# Patient Record
Sex: FEMALE | Race: WHITE | NOT HISPANIC OR LATINO | Employment: FULL TIME | URBAN - METROPOLITAN AREA
[De-identification: names, ages, dates, MRNs, and addresses within clinical notes are randomized per-mention and may not be internally consistent; named-entity substitution may affect disease eponyms.]

---

## 2017-01-25 DIAGNOSIS — Z77.21 EXPOSURE TO BLOOD OR BODY FLUID: Primary | ICD-10-CM

## 2017-01-25 DIAGNOSIS — J11.1 INFLUENZA: ICD-10-CM

## 2017-01-25 RX ORDER — OSELTAMIVIR PHOSPHATE 75 MG/1
75 CAPSULE ORAL 2 TIMES DAILY
Qty: 20 CAPSULE | Refills: 0 | Status: SHIPPED | OUTPATIENT
Start: 2017-01-25 | End: 2017-02-04

## 2019-10-23 ENCOUNTER — CLINICAL SUPPORT (OUTPATIENT)
Dept: OTHER | Facility: CLINIC | Age: 29
End: 2019-10-23
Payer: COMMERCIAL

## 2019-10-23 DIAGNOSIS — Z00.8 ENCOUNTER FOR OTHER GENERAL EXAMINATION: ICD-10-CM

## 2019-10-23 PROCEDURE — 82947 ASSAY GLUCOSE BLOOD QUANT: CPT | Mod: QW,S$GLB,, | Performed by: INTERNAL MEDICINE

## 2019-10-23 PROCEDURE — 80061 LIPID PANEL: CPT | Mod: QW,S$GLB,, | Performed by: INTERNAL MEDICINE

## 2019-10-23 PROCEDURE — 99401 PR PREVENT COUNSEL,INDIV,15 MIN: ICD-10-PCS | Mod: S$GLB,,, | Performed by: INTERNAL MEDICINE

## 2019-10-23 PROCEDURE — 80061 PR  LIPID PANEL: ICD-10-PCS | Mod: QW,S$GLB,, | Performed by: INTERNAL MEDICINE

## 2019-10-23 PROCEDURE — 82947 PR  ASSAY QUANTITATIVE,BLOOD GLUCOSE: ICD-10-PCS | Mod: QW,S$GLB,, | Performed by: INTERNAL MEDICINE

## 2019-10-23 PROCEDURE — 99401 PREV MED CNSL INDIV APPRX 15: CPT | Mod: S$GLB,,, | Performed by: INTERNAL MEDICINE

## 2019-10-24 VITALS — HEIGHT: 71 IN

## 2019-10-24 LAB
GLUCOSE SERPL-MCNC: 86 MG/DL (ref 60–140)
HDLC SERPL-MCNC: 90 MG/DL
POC CHOLESTEROL, TOTAL: 158 MG/DL
TRIGL SERPL-MCNC: 45 MG/DL

## 2020-03-11 ENCOUNTER — LAB VISIT (OUTPATIENT)
Dept: LAB | Facility: HOSPITAL | Age: 30
End: 2020-03-11
Attending: NURSE PRACTITIONER
Payer: COMMERCIAL

## 2020-03-11 ENCOUNTER — OFFICE VISIT (OUTPATIENT)
Dept: INTERNAL MEDICINE | Facility: CLINIC | Age: 30
End: 2020-03-11
Payer: COMMERCIAL

## 2020-03-11 VITALS
HEART RATE: 80 BPM | DIASTOLIC BLOOD PRESSURE: 74 MMHG | SYSTOLIC BLOOD PRESSURE: 108 MMHG | WEIGHT: 133.38 LBS | BODY MASS INDEX: 19.09 KG/M2 | OXYGEN SATURATION: 98 % | HEIGHT: 70 IN

## 2020-03-11 DIAGNOSIS — M25.50 ARTHRALGIA, UNSPECIFIED JOINT: ICD-10-CM

## 2020-03-11 DIAGNOSIS — R53.83 FATIGUE, UNSPECIFIED TYPE: ICD-10-CM

## 2020-03-11 DIAGNOSIS — Z11.3 SCREEN FOR STD (SEXUALLY TRANSMITTED DISEASE): ICD-10-CM

## 2020-03-11 DIAGNOSIS — Z00.00 ANNUAL PHYSICAL EXAM: Primary | ICD-10-CM

## 2020-03-11 DIAGNOSIS — R63.4 UNEXPLAINED WEIGHT LOSS: ICD-10-CM

## 2020-03-11 DIAGNOSIS — J30.9 ALLERGIC RHINITIS, UNSPECIFIED SEASONALITY, UNSPECIFIED TRIGGER: ICD-10-CM

## 2020-03-11 DIAGNOSIS — R61 NIGHT SWEATS: ICD-10-CM

## 2020-03-11 DIAGNOSIS — Z00.00 ANNUAL PHYSICAL EXAM: ICD-10-CM

## 2020-03-11 LAB
25(OH)D3+25(OH)D2 SERPL-MCNC: 13 NG/ML (ref 30–96)
BASOPHILS # BLD AUTO: 0.02 K/UL (ref 0–0.2)
BASOPHILS NFR BLD: 0.5 % (ref 0–1.9)
DIFFERENTIAL METHOD: ABNORMAL
EOSINOPHIL # BLD AUTO: 0.1 K/UL (ref 0–0.5)
EOSINOPHIL NFR BLD: 1.9 % (ref 0–8)
ERYTHROCYTE [DISTWIDTH] IN BLOOD BY AUTOMATED COUNT: 11.7 % (ref 11.5–14.5)
HCT VFR BLD AUTO: 39.8 % (ref 37–48.5)
HGB BLD-MCNC: 12.7 G/DL (ref 12–16)
IMM GRANULOCYTES # BLD AUTO: 0.01 K/UL (ref 0–0.04)
IMM GRANULOCYTES NFR BLD AUTO: 0.2 % (ref 0–0.5)
LYMPHOCYTES # BLD AUTO: 1.4 K/UL (ref 1–4.8)
LYMPHOCYTES NFR BLD: 33.1 % (ref 18–48)
MCH RBC QN AUTO: 35.3 PG (ref 27–31)
MCHC RBC AUTO-ENTMCNC: 31.9 G/DL (ref 32–36)
MCV RBC AUTO: 111 FL (ref 82–98)
MONOCYTES # BLD AUTO: 0.4 K/UL (ref 0.3–1)
MONOCYTES NFR BLD: 10.1 % (ref 4–15)
NEUTROPHILS # BLD AUTO: 2.2 K/UL (ref 1.8–7.7)
NEUTROPHILS NFR BLD: 54.2 % (ref 38–73)
NRBC BLD-RTO: 0 /100 WBC
PLATELET # BLD AUTO: 238 K/UL (ref 150–350)
PMV BLD AUTO: 10 FL (ref 9.2–12.9)
RBC # BLD AUTO: 3.6 M/UL (ref 4–5.4)
WBC # BLD AUTO: 4.14 K/UL (ref 3.9–12.7)

## 2020-03-11 PROCEDURE — 87491 CHLMYD TRACH DNA AMP PROBE: CPT

## 2020-03-11 PROCEDURE — 99999 PR PBB SHADOW E&M-EST. PATIENT-LVL III: ICD-10-PCS | Mod: PBBFAC,,, | Performed by: NURSE PRACTITIONER

## 2020-03-11 PROCEDURE — 99385 PREV VISIT NEW AGE 18-39: CPT | Mod: S$GLB,,, | Performed by: NURSE PRACTITIONER

## 2020-03-11 PROCEDURE — 83540 ASSAY OF IRON: CPT

## 2020-03-11 PROCEDURE — 80053 COMPREHEN METABOLIC PANEL: CPT

## 2020-03-11 PROCEDURE — 85025 COMPLETE CBC W/AUTO DIFF WBC: CPT

## 2020-03-11 PROCEDURE — 99385 PR PREVENTIVE VISIT,NEW,18-39: ICD-10-PCS | Mod: S$GLB,,, | Performed by: NURSE PRACTITIONER

## 2020-03-11 PROCEDURE — 86703 HIV-1/HIV-2 1 RESULT ANTBDY: CPT

## 2020-03-11 PROCEDURE — 81003 URINALYSIS AUTO W/O SCOPE: CPT

## 2020-03-11 PROCEDURE — 86038 ANTINUCLEAR ANTIBODIES: CPT

## 2020-03-11 PROCEDURE — 86039 ANTINUCLEAR ANTIBODIES (ANA): CPT

## 2020-03-11 PROCEDURE — 84443 ASSAY THYROID STIM HORMONE: CPT

## 2020-03-11 PROCEDURE — 82607 VITAMIN B-12: CPT

## 2020-03-11 PROCEDURE — 86235 NUCLEAR ANTIGEN ANTIBODY: CPT | Mod: 59

## 2020-03-11 PROCEDURE — 82306 VITAMIN D 25 HYDROXY: CPT

## 2020-03-11 PROCEDURE — 99999 PR PBB SHADOW E&M-EST. PATIENT-LVL III: CPT | Mod: PBBFAC,,, | Performed by: NURSE PRACTITIONER

## 2020-03-11 RX ORDER — FLUTICASONE PROPIONATE 50 MCG
1 SPRAY, SUSPENSION (ML) NASAL DAILY
Qty: 16 G | Refills: 0 | Status: SHIPPED | OUTPATIENT
Start: 2020-03-11 | End: 2020-04-02

## 2020-03-11 RX ORDER — AZELASTINE 1 MG/ML
1 SPRAY, METERED NASAL 2 TIMES DAILY
Qty: 30 ML | Refills: 0 | Status: SHIPPED | OUTPATIENT
Start: 2020-03-11 | End: 2021-04-26

## 2020-03-11 NOTE — PROGRESS NOTES
Internal Medicine Annual Exam       CHIEF COMPLAINT     The patient, Rachel Duncan, who is a 30 y.o. female presents for an annual exam.    HPI     Reports night sweats intermittent x 6 months. 1 episode in the past 2 weeks. 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     Exercise- none, walks dog 2-3 times per day   Sleep- trouble sleeping, uses melatonin. Still wakes up nightly to urinate.   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 Moderate amount now that she is working.     HM-   PAP- 2 years ago   Flu- refuses   Tdap- less than 10 years.   STD screening - 2 years ago - condom use 100%     Past Medical History:  Past Medical History:   Diagnosis Date    Mononucleosis        No past surgical history on file.     No family history on file.     Social History     Socioeconomic History    Marital status: Single     Spouse name: Not on file    Number of children: Not on file    Years of education: Not on file    Highest education level: Not on file   Occupational History    Not on file   Social Needs    Financial resource strain: Not on file    Food insecurity:     Worry: Not on file     Inability: Not on file    Transportation needs:     Medical: Not on file     Non-medical: Not on file   Tobacco Use    Smoking status: Never Smoker    Smokeless tobacco: Never Used   Substance and Sexual Activity    Alcohol use: Yes     Frequency: Monthly or less     Drinks per session: 1 or 2     Binge frequency: Monthly    Drug use: Not on file    Sexual activity: Not on file   Lifestyle    Physical activity:     Days per week: Not on file     Minutes per session: Not on file    Stress: Not on file   Relationships    Social connections:     Talks on phone: Not on file     Gets together: Not on file     Attends Judaism service: Not on file     Active member of club or organization: Not on file     Attends meetings of clubs or organizations: Not on file      "Relationship status: Not on file   Other Topics Concern    Not on file   Social History Narrative    Not on file        Social History     Tobacco Use   Smoking Status Never Smoker   Smokeless Tobacco Never Used        Allergies as of 03/11/2020    (No Known Allergies)          Home Medications:  Prior to Admission medications    Not on File       Review of Systems:  Review of Systems   Constitutional: Positive for chills, diaphoresis, fatigue and unexpected weight change. Negative for activity change, appetite change and fever.   HENT: Positive for postnasal drip, rhinorrhea and sneezing. Negative for congestion.    Eyes: Positive for discharge. Negative for visual disturbance.        Last eye exam - 1 year ago.    Respiratory: Negative for cough, shortness of breath and wheezing.    Cardiovascular: Negative for chest pain, palpitations and leg swelling.   Gastrointestinal: Negative for abdominal pain, constipation, diarrhea, nausea and vomiting.   Genitourinary: Negative.    Musculoskeletal: Positive for arthralgias (bilateral elbow, hips, back and neck - for "years". Uses cyclobenzaprine as needed. symptoms are intermitent worse with stress. ). Negative for myalgias.   Skin: Negative for rash.   Neurological: Positive for headaches (1-2 times per week. located to the paretial region descrobed as dull. occurs gradually throughout the day ). Negative for dizziness and light-headedness.   Psychiatric/Behavioral: Positive for sleep disturbance. Negative for dysphoric mood. The patient is not nervous/anxious.        Health Maintainence:   Immunizations:  Health Maintenance       Date Due Completion Date    HIV Screening 02/13/2005 ---    TETANUS VACCINE 02/13/2008 ---    Pap Smear with HPV Cotest 02/13/2011 ---    Influenza Vaccine (1) 09/01/2019 ---           PHYSICAL EXAM     /74 (BP Location: Right arm, Patient Position: Sitting, BP Method: Medium (Manual))   Pulse 80   Ht 5' 10" (1.778 m)   Wt 60.5 kg " (133 lb 6.1 oz)   SpO2 98%   BMI 19.14 kg/m²  Body mass index is 19.14 kg/m².    Physical Exam   Constitutional: She is oriented to person, place, and time. She appears well-developed and well-nourished.   HENT:   Head: Normocephalic.   Right Ear: External ear normal.   Left Ear: External ear normal.   Nose: Nose normal.   Mouth/Throat: Oropharynx is clear and moist. No oropharyngeal exudate.   Eyes: Pupils are equal, round, and reactive to light.   Neck: Neck supple. No JVD present. No tracheal deviation present. No thyromegaly present.   Cardiovascular: Normal rate, regular rhythm, normal heart sounds and intact distal pulses. Exam reveals no gallop and no friction rub.   No murmur heard.  Pulmonary/Chest: Effort normal and breath sounds normal. No respiratory distress. She has no wheezes. She has no rales.   Abdominal: Soft. Bowel sounds are normal. She exhibits no distension. There is no tenderness.   Musculoskeletal: Normal range of motion. She exhibits no edema or tenderness.   Lymphadenopathy:     She has no cervical adenopathy.   Neurological: She is alert and oriented to person, place, and time. She displays normal reflexes. No cranial nerve deficit (III-XII grossly intact ). She exhibits normal muscle tone.   Skin: Skin is warm and dry. Capillary refill takes less than 2 seconds. No rash noted.   Psychiatric: She has a normal mood and affect. Her behavior is normal.   Vitals reviewed.      LABS     No results found for: LABA1C, HGBA1C  CMP  Sodium   Date Value Ref Range Status   08/24/2007 137 136 - 145 mMol/l Final     Potassium   Date Value Ref Range Status   08/24/2007 3.7 3.3 - 5.3 mMol/l Final     Chloride   Date Value Ref Range Status   08/24/2007 101 95 - 110 mMol/l Final     CO2   Date Value Ref Range Status   08/24/2007 27 23.0 - 29.0 mEq/L Final     Glucose   Date Value Ref Range Status   08/24/2007 112 (H) 70 - 110 mg/dl Final     BUN, Bld   Date Value Ref Range Status   08/24/2007 6 5 - 23  mg/dl Final     Creatinine   Date Value Ref Range Status   08/24/2007 1.0 0.5 - 1.4 mg/dl Final     Calcium   Date Value Ref Range Status   08/24/2007 8.7 8.7 - 10.5 mg/dl Final     Total Protein   Date Value Ref Range Status   08/24/2007 7.3 6.0 - 8.4 gm/dl Final     Albumin   Date Value Ref Range Status   08/24/2007 4.5 3.2 - 4.7 g/dl Final     Total Bilirubin   Date Value Ref Range Status   08/24/2007 0.5 0.1 - 1.0 mg/dl Final     Comment:     These are the guidelines recommended by the .  Especially  for infants and newborns, interpretation of results should be based  on gestational age, weight and in agreement with clinical  observations.  .  Premature Infant recommended reference range (Bilirubin, Total)  Up to 24 hours.............<8.0 mg/dl  Up to 48 hours............<12.0 mg/dl  3-5 days..................<15.0 mg/dl  6-29 days.................<15.0 mg/dl       Alkaline Phosphatase   Date Value Ref Range Status   08/24/2007 86 47 - 119 U/L Final     AST   Date Value Ref Range Status   08/24/2007 23 0 - 31 U/L Final     ALT   Date Value Ref Range Status   08/24/2007 20 0 - 31 U/L Final     Lab Results   Component Value Date    WBC 7.59 08/24/2007    HGB 11.7 (L) 08/24/2007    HCT 34.2 (L) 08/24/2007    MCV 92.7 08/24/2007     08/24/2007     No results found for: CHOL  No results found for: HDL  No results found for: LDLCALC  No results found for: TRIG  No results found for: CHOLHDL  No results found for: TSH, D7MEEEG, Z1NDTYR, THYROIDAB    ASSESSMENT/PLAN     Rachel Duncan is a 30 y.o. female    Annual physical exam- al l ge and gender related screenings.   -     CBC auto differential; Future; Expected date: 03/11/2020  -     Comprehensive metabolic panel; Future; Expected date: 03/11/2020  -     TSH; Future; Expected date: 03/11/2020  -     Vitamin D; Future; Expected date: 03/11/2020  -     Urinalysis    Fatigue, unspecified type- will check labs. Suggest exercise and healthy diet.    -     Iron and TIBC; Future; Expected date: 03/11/2020  -     TO Screen w/Reflex; Future; Expected date: 03/11/2020    Arthralgia, unspecified joint- tumeric as needed.   -     TO Screen w/Reflex; Future; Expected date: 03/11/2020    Night sweats- with unexplained weight loss and h/o work with incarcerated clients. Will send for TB gold.   -     Quantiferon Gold TB; Future; Expected date: 03/11/2020    Unexplained weight loss  -     Quantiferon Gold TB; Future; Expected date: 03/11/2020    Allergic rhinitis, unspecified seasonality, unspecified trigger- poorly controlled. Will start flonase and astelin.   -     azelastine (ASTELIN) 137 mcg (0.1 %) nasal spray; 1 spray (137 mcg total) by Nasal route 2 (two) times daily.  Dispense: 30 mL; Refill: 0  -     fluticasone propionate (FLONASE) 50 mcg/actuation nasal spray; 1 spray (50 mcg total) by Each Nostril route once daily.  Dispense: 16 g; Refill: 0    Screen for STD (sexually transmitted disease)  -     HIV 1/2 Ag/Ab (4th Gen); Future; Expected date: 03/11/2020  -     C. trachomatis/N. gonorrhoeae by AMP DNA    Other orders  -     sodium chloride (SALINE NASAL) 0.65 % nasal spray; 1 spray by Nasal route as needed for Congestion.; Refill: 12    Follow up with PCP     Ernestina BELLO, APRN, FNP-c   Department of Internal Medicine - [facility]  2:00 PM

## 2020-03-12 ENCOUNTER — TELEPHONE (OUTPATIENT)
Dept: INTERNAL MEDICINE | Facility: CLINIC | Age: 30
End: 2020-03-12

## 2020-03-12 DIAGNOSIS — D75.89 MACROCYTOSIS: Primary | ICD-10-CM

## 2020-03-12 LAB
ALBUMIN SERPL BCP-MCNC: 5.1 G/DL (ref 3.5–5.2)
ALP SERPL-CCNC: 58 U/L (ref 55–135)
ALT SERPL W/O P-5'-P-CCNC: 12 U/L (ref 10–44)
ANION GAP SERPL CALC-SCNC: 15 MMOL/L (ref 8–16)
AST SERPL-CCNC: 23 U/L (ref 10–40)
BILIRUB SERPL-MCNC: 0.8 MG/DL (ref 0.1–1)
BILIRUB UR QL STRIP: NEGATIVE
BUN SERPL-MCNC: 9 MG/DL (ref 6–20)
C TRACH DNA SPEC QL NAA+PROBE: NOT DETECTED
CALCIUM SERPL-MCNC: 9.5 MG/DL (ref 8.7–10.5)
CHLORIDE SERPL-SCNC: 100 MMOL/L (ref 95–110)
CLARITY UR REFRACT.AUTO: CLEAR
CO2 SERPL-SCNC: 24 MMOL/L (ref 23–29)
COLOR UR AUTO: YELLOW
CREAT SERPL-MCNC: 0.8 MG/DL (ref 0.5–1.4)
EST. GFR  (AFRICAN AMERICAN): >60 ML/MIN/1.73 M^2
EST. GFR  (NON AFRICAN AMERICAN): >60 ML/MIN/1.73 M^2
GLUCOSE SERPL-MCNC: 90 MG/DL (ref 70–110)
GLUCOSE UR QL STRIP: NEGATIVE
HGB UR QL STRIP: NEGATIVE
HIV 1+2 AB+HIV1 P24 AG SERPL QL IA: NEGATIVE
IRON SERPL-MCNC: 111 UG/DL (ref 30–160)
KETONES UR QL STRIP: NEGATIVE
LEUKOCYTE ESTERASE UR QL STRIP: NEGATIVE
N GONORRHOEA DNA SPEC QL NAA+PROBE: NOT DETECTED
NITRITE UR QL STRIP: NEGATIVE
PH UR STRIP: 5 [PH] (ref 5–8)
POTASSIUM SERPL-SCNC: 3.7 MMOL/L (ref 3.5–5.1)
PROT SERPL-MCNC: 8.1 G/DL (ref 6–8.4)
PROT UR QL STRIP: NEGATIVE
SATURATED IRON: 28 % (ref 20–50)
SODIUM SERPL-SCNC: 139 MMOL/L (ref 136–145)
SP GR UR STRIP: 1.01 (ref 1–1.03)
TOTAL IRON BINDING CAPACITY: 398 UG/DL (ref 250–450)
TRANSFERRIN SERPL-MCNC: 269 MG/DL (ref 200–375)
TSH SERPL DL<=0.005 MIU/L-ACNC: 1.43 UIU/ML (ref 0.4–4)
URN SPEC COLLECT METH UR: NORMAL
VIT B12 SERPL-MCNC: 264 PG/ML (ref 210–950)

## 2020-03-12 NOTE — TELEPHONE ENCOUNTER
----- Message from Ernestina Pop NP sent at 3/12/2020  7:52 AM CDT -----  Please see if the lab can add Vit B12 panel to the labs.

## 2020-03-17 ENCOUNTER — PATIENT MESSAGE (OUTPATIENT)
Dept: INTERNAL MEDICINE | Facility: CLINIC | Age: 30
End: 2020-03-17

## 2020-03-17 DIAGNOSIS — E55.9 VITAMIN D DEFICIENCY: Primary | ICD-10-CM

## 2020-03-17 RX ORDER — ERGOCALCIFEROL 1.25 MG/1
50000 CAPSULE ORAL
Qty: 12 CAPSULE | Refills: 0 | Status: SHIPPED | OUTPATIENT
Start: 2020-03-17 | End: 2020-04-09

## 2020-03-18 LAB
ANA SER QL IF: POSITIVE
ANA TITR SER IF: NORMAL {TITER}

## 2020-03-19 ENCOUNTER — TELEPHONE (OUTPATIENT)
Dept: INTERNAL MEDICINE | Facility: CLINIC | Age: 30
End: 2020-03-19

## 2020-03-19 DIAGNOSIS — R76.8 POSITIVE ANA (ANTINUCLEAR ANTIBODY): Primary | ICD-10-CM

## 2020-03-19 NOTE — TELEPHONE ENCOUNTER
----- Message from Ed Aly sent at 3/19/2020  1:09 PM CDT -----  Contact: Pt [phone]  Patient is returning a phone call.  Who left a message for the patient: KRISTIN Do  Does patient know what this is regarding:  No

## 2020-03-19 NOTE — TELEPHONE ENCOUNTER
LM for pt to return call regarding labs   TO - positive - will refer to rheum   Vit d - low - will start ergo 50,000 weekly and vitmain d3 2000iu daily

## 2020-03-22 LAB
ANTI SM ANTIBODY: 0.07 RATIO (ref 0–0.99)
ANTI SM/RNP ANTIBODY: 0.09 RATIO (ref 0–0.99)
ANTI-SM INTERPRETATION: NEGATIVE
ANTI-SM/RNP INTERPRETATION: NEGATIVE
ANTI-SSA ANTIBODY: 0.07 RATIO (ref 0–0.99)
ANTI-SSA INTERPRETATION: NEGATIVE
ANTI-SSB ANTIBODY: 0.08 RATIO (ref 0–0.99)
ANTI-SSB INTERPRETATION: NEGATIVE
DSDNA AB SER-ACNC: NORMAL [IU]/ML

## 2020-04-02 DIAGNOSIS — J30.9 ALLERGIC RHINITIS, UNSPECIFIED SEASONALITY, UNSPECIFIED TRIGGER: ICD-10-CM

## 2020-04-02 RX ORDER — FLUTICASONE PROPIONATE 50 MCG
1 SPRAY, SUSPENSION (ML) NASAL DAILY
Qty: 16 ML | Refills: 0 | Status: SHIPPED | OUTPATIENT
Start: 2020-04-02 | End: 2020-05-11

## 2020-04-09 DIAGNOSIS — E55.9 VITAMIN D DEFICIENCY: ICD-10-CM

## 2020-04-09 RX ORDER — ERGOCALCIFEROL 1.25 MG/1
CAPSULE ORAL
Qty: 4 CAPSULE | Refills: 2 | Status: SHIPPED | OUTPATIENT
Start: 2020-04-09 | End: 2020-07-02

## 2020-05-09 DIAGNOSIS — J30.9 ALLERGIC RHINITIS, UNSPECIFIED SEASONALITY, UNSPECIFIED TRIGGER: ICD-10-CM

## 2020-05-11 RX ORDER — FLUTICASONE PROPIONATE 50 MCG
1 SPRAY, SUSPENSION (ML) NASAL DAILY
Qty: 16 ML | Refills: 0 | Status: SHIPPED | OUTPATIENT
Start: 2020-05-11 | End: 2021-04-26

## 2020-05-22 ENCOUNTER — PATIENT MESSAGE (OUTPATIENT)
Dept: RHEUMATOLOGY | Facility: CLINIC | Age: 30
End: 2020-05-22

## 2020-07-22 ENCOUNTER — OFFICE VISIT (OUTPATIENT)
Dept: RHEUMATOLOGY | Facility: CLINIC | Age: 30
End: 2020-07-22
Payer: COMMERCIAL

## 2020-07-22 ENCOUNTER — HOSPITAL ENCOUNTER (OUTPATIENT)
Dept: RADIOLOGY | Facility: HOSPITAL | Age: 30
Discharge: HOME | End: 2020-07-22
Payer: COMMERCIAL

## 2020-07-22 VITALS
HEART RATE: 92 BPM | HEIGHT: 70 IN | SYSTOLIC BLOOD PRESSURE: 91 MMHG | TEMPERATURE: 97 F | DIASTOLIC BLOOD PRESSURE: 66 MMHG | BODY MASS INDEX: 20.01 KG/M2 | WEIGHT: 139.75 LBS

## 2020-07-22 DIAGNOSIS — R76.8 POSITIVE ANA (ANTINUCLEAR ANTIBODY): Primary | ICD-10-CM

## 2020-07-22 DIAGNOSIS — M54.9 UPPER BACK PAIN: ICD-10-CM

## 2020-07-22 PROCEDURE — 72070 X-RAY EXAM THORAC SPINE 2VWS: CPT | Mod: 26,,, | Performed by: RADIOLOGY

## 2020-07-22 PROCEDURE — 99244 OFF/OP CNSLTJ NEW/EST MOD 40: CPT | Mod: S$GLB,,,

## 2020-07-22 PROCEDURE — 99999 PR PBB SHADOW E&M-EST. PATIENT-LVL III: CPT | Mod: PBBFAC,,,

## 2020-07-22 PROCEDURE — 72070 X-RAY EXAM THORAC SPINE 2VWS: CPT | Mod: TC

## 2020-07-22 PROCEDURE — 72070 XR THORACIC SPINE AP LATERAL: ICD-10-PCS | Mod: 26,,, | Performed by: RADIOLOGY

## 2020-07-22 PROCEDURE — 99999 PR PBB SHADOW E&M-EST. PATIENT-LVL III: ICD-10-PCS | Mod: PBBFAC,,,

## 2020-07-22 PROCEDURE — 99244 PR OFFICE CONSULTATION,LEVEL IV: ICD-10-PCS | Mod: S$GLB,,,

## 2020-07-22 ASSESSMENT — ROUTINE ASSESSMENT OF PATIENT INDEX DATA (RAPID3)
PSYCHOLOGICAL DISTRESS SCORE: 2.2
PAIN SCORE: 1
AM STIFFNESS SCORE: 1, YES
TOTAL RAPID3 SCORE: 0.33
MDHAQ FUNCTION SCORE: 0
WHEN YOU AWAKENED IN THE MORNING OVER THE LAST WEEK, PLEASE INDICATE THE AMOUNT OF TIME IT TAKES UNTIL YOU ARE AS LIMBER AS YOU WILL BE FOR THE DAY: 2 HOURS
PATIENT GLOBAL ASSESSMENT SCORE: 0
FATIGUE SCORE: 1

## 2020-07-22 NOTE — ASSESSMENT & PLAN NOTE
Pt with some neck and upper back pain; Subjectively feels her spine is not in alignment but appears aligned on exam with no limited range of motion  - Has taken flexeril with some relief and tylenol which is mildly effective  - Recommended trying advil or aleve to see if this helps  - Will get Xray of thoracic spine

## 2020-07-22 NOTE — PROGRESS NOTES
Subjective:       Patient ID: Rachel Duncan is a 30 y.o. female.    Chief Complaint: Follow-up for abnormal lab (TO)    HPI   Pt is a 31yo F who was referred here after having a positive TO test in March.  At that time she was worked up for having night sweats and fatigue.  Reported a recent history of weight loss as well which she had attributed to stress from school.  At that time she was diagnosed with a Vitamin D deficiency.  Besides the weakly positive TO at 1:80 the rest of the workup was normal.  She was started on Vitamin D supplementation and since then now feels much improved.  She is no longer having frequent night sweats but has had them rarely.  When she has these she sometimes has chills, but never feels as if she has a fever.  She states that she has had achy pains for a long time since her growth spurt when she was a child.  Mainly states the pain is in her neck and upper back.  Feels she has morning stiffness with this sometimes and can be better with activity but not always.  Often feels there is a crick in her neck and is wondering if her spine is in alignment.  Has considered going to a chiropractor.  She sometimes takes tylenol for the pain which helps minimally.  Has taken Flexeril prn with good results but tries to limit due to sedation.  Otherwise today patient is feeling well.  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      Review of Systems    No skin rashes,malar rash,photosensitivity   No telangiectasias   No psoriasis   No patchy alopecia   No oral and nasal ulcers   No dry eyes and dry mouth   No pleurisy or any cardiopulmonary complaints   No dysphagia,diplopia and dysphonia and muscle weakness   No n/v/d/c   No acid reflux  No raynaud's  No digital ulcers   No cytopenias   No renal issues   No blood clots   +Night sweats, chills; No fever,weight loss and loss of appetite  No pregnancy losses/pre term deliveries/pregnancy  "complications   +Occ. Mild frontal headaches  No recurrent conjunctivitis or uveitis or scleritis or episcleritis   No chronic or bloody diarrhea with no u colitis or crohn's/inflammatory bowel disease   No vaginal or urethral  d/c/STDs/no ulcers     Objective:   BP 91/66   Pulse 92   Temp 97.1 °F (36.2 °C)   Ht 5' 10" (1.778 m)   Wt 63.4 kg (139 lb 12.4 oz)   LMP 07/08/2020   BMI 20.06 kg/m²      Physical Exam   Nursing note and vitals reviewed.  Constitutional: She is oriented to person, place, and time and well-developed, well-nourished, and in no distress. No distress.   HENT:   Head: Normocephalic and atraumatic.   Eyes: EOM are normal. Pupils are equal, round, and reactive to light. Right eye exhibits no discharge. Left eye exhibits no discharge. No scleral icterus.   Neck: Normal range of motion and full passive range of motion without pain. Neck supple. No spinous process tenderness and no muscular tenderness present.   Cardiovascular: Normal rate, regular rhythm, normal heart sounds and intact distal pulses.    Pulmonary/Chest: Effort normal and breath sounds normal. No respiratory distress. She has no wheezes.   Abdominal: Soft. She exhibits no distension. There is no abdominal tenderness. There is no rebound and no guarding.   Back/Neck Exam   General Inspection She has no scoliosis   Spinal Kyphosis: absent  Spinal Lordosis:  absent    Neck Range of Motion   Right Lateral Bend: normal  Left Lateral Bend: normal  Right Rotation: normal  Left Rotation: normal  Neurological: She is alert and oriented to person, place, and time.   Skin: Skin is warm and dry. No rash noted. She is not diaphoretic. No erythema.     Psychiatric: Mood and affect normal.   Musculoskeletal: No tenderness, deformity or edema.      Comments: Some mild hyperextensibility of joints. Full ROM otherwise. No joint swelling or synovitis.       3/11/2020    TO 1:80 Homogenous  TO Profile Negative  Vit D 13  TSH WNL  Quantiferon " Negative       Assessment:       1. Positive TO (antinuclear antibody)    2. Upper back pain             Plan:       Problem List Items Addressed This Visit        Immunology/Multi System    Positive TO (antinuclear antibody) - Primary     Pt with TO positive at 1:80; TO profile was negative  - No concerning history or exam findings for lupus or other rheumatological illness            Orthopedic    Upper back pain     Pt with some neck and upper back pain; Subjectively feels her spine is not in alignment but appears aligned on exam with no limited range of motion  - Has taken flexeril with some relief and tylenol which is mildly effective  - Recommended trying advil or aleve to see if this helps  - Will get Xray of thoracic spine         Relevant Orders    X-Ray Thoracic Spine AP Lateral (Completed)              Prakash Lowe MD, PGY-4  [facility]

## 2020-07-22 NOTE — ASSESSMENT & PLAN NOTE
Pt with TO positive at 1:80; TO profile was negative  - No concerning history or exam findings for lupus or other rheumatological illness

## 2020-07-23 NOTE — PROGRESS NOTES
I have personally taken the history and examined the patient and concur with the resident's note as above.  She was referred in because of a positive TO which was obtained because of fatigue.  The fatigue responded to vitamin-D supplementation.  Her TO is barely positive and I suspect it is a false-positive test.  She also does have some upper back pain.  She has some suggestion of inflammatory back pain.  We will obtain an x-ray of her thoracic spine.  No other workup is needed at this time.  I did recommend trying NSAIDs rather than Tylenol when her back is bothering her.

## 2021-02-23 ENCOUNTER — PATIENT MESSAGE (OUTPATIENT)
Dept: RHEUMATOLOGY | Facility: CLINIC | Age: 31
End: 2021-02-23

## 2021-03-24 ENCOUNTER — TELEPHONE (OUTPATIENT)
Dept: OBSTETRICS AND GYNECOLOGY | Facility: CLINIC | Age: 31
End: 2021-03-24

## 2021-04-15 ENCOUNTER — HOSPITAL ENCOUNTER (OUTPATIENT)
Dept: RADIOLOGY | Facility: HOSPITAL | Age: 31
Discharge: HOME | End: 2021-04-15
Attending: NURSE PRACTITIONER
Payer: COMMERCIAL

## 2021-04-15 ENCOUNTER — OFFICE VISIT (OUTPATIENT)
Dept: INTERNAL MEDICINE | Facility: CLINIC | Age: 31
End: 2021-04-15
Payer: COMMERCIAL

## 2021-04-15 ENCOUNTER — TELEPHONE (OUTPATIENT)
Dept: INTERNAL MEDICINE | Facility: CLINIC | Age: 31
End: 2021-04-15

## 2021-04-15 ENCOUNTER — LAB VISIT (OUTPATIENT)
Dept: LAB | Facility: HOSPITAL | Age: 31
End: 2021-04-15
Payer: COMMERCIAL

## 2021-04-15 ENCOUNTER — IMMUNIZATION (OUTPATIENT)
Dept: PHARMACY | Facility: CLINIC | Age: 31
End: 2021-04-15
Payer: COMMERCIAL

## 2021-04-15 VITALS
DIASTOLIC BLOOD PRESSURE: 75 MMHG | WEIGHT: 136.69 LBS | SYSTOLIC BLOOD PRESSURE: 110 MMHG | HEART RATE: 79 BPM | BODY MASS INDEX: 19.57 KG/M2 | HEIGHT: 70 IN | OXYGEN SATURATION: 99 %

## 2021-04-15 DIAGNOSIS — Z11.59 ENCOUNTER FOR HEPATITIS C SCREENING TEST FOR LOW RISK PATIENT: ICD-10-CM

## 2021-04-15 DIAGNOSIS — Z01.89 ROUTINE LAB DRAW: ICD-10-CM

## 2021-04-15 DIAGNOSIS — M62.81 MUSCLE WEAKNESS (GENERALIZED): ICD-10-CM

## 2021-04-15 DIAGNOSIS — E55.9 VITAMIN D DEFICIENCY: ICD-10-CM

## 2021-04-15 DIAGNOSIS — Z00.00 ANNUAL PHYSICAL EXAM: Primary | ICD-10-CM

## 2021-04-15 DIAGNOSIS — R47.89 EPISODE OF CHANGE IN SPEECH: ICD-10-CM

## 2021-04-15 DIAGNOSIS — R51.9 NONINTRACTABLE HEADACHE, UNSPECIFIED CHRONICITY PATTERN, UNSPECIFIED HEADACHE TYPE: ICD-10-CM

## 2021-04-15 DIAGNOSIS — Z00.00 ANNUAL PHYSICAL EXAM: ICD-10-CM

## 2021-04-15 DIAGNOSIS — R42 DIZZINESS AND GIDDINESS: ICD-10-CM

## 2021-04-15 DIAGNOSIS — G62.9 NEUROPATHY: ICD-10-CM

## 2021-04-15 DIAGNOSIS — Z12.4 PAPANICOLAOU SMEAR FOR CERVICAL CANCER SCREENING: ICD-10-CM

## 2021-04-15 DIAGNOSIS — Z23 NEED FOR TDAP VACCINATION: ICD-10-CM

## 2021-04-15 DIAGNOSIS — M54.9 DORSALGIA, UNSPECIFIED: ICD-10-CM

## 2021-04-15 LAB
25(OH)D3+25(OH)D2 SERPL-MCNC: 23 NG/ML (ref 30–96)
ALBUMIN SERPL BCP-MCNC: 4.7 G/DL (ref 3.5–5.2)
ALP SERPL-CCNC: 48 U/L (ref 55–135)
ALT SERPL W/O P-5'-P-CCNC: 7 U/L (ref 10–44)
ANION GAP SERPL CALC-SCNC: 9 MMOL/L (ref 8–16)
AST SERPL-CCNC: 16 U/L (ref 10–40)
BASOPHILS # BLD AUTO: 0.02 K/UL (ref 0–0.2)
BASOPHILS NFR BLD: 0.5 % (ref 0–1.9)
BILIRUB SERPL-MCNC: 0.6 MG/DL (ref 0.1–1)
BUN SERPL-MCNC: 10 MG/DL (ref 6–20)
CALCIUM SERPL-MCNC: 9.9 MG/DL (ref 8.7–10.5)
CHLORIDE SERPL-SCNC: 104 MMOL/L (ref 95–110)
CO2 SERPL-SCNC: 29 MMOL/L (ref 23–29)
CREAT SERPL-MCNC: 0.8 MG/DL (ref 0.5–1.4)
CRP SERPL-MCNC: 0.7 MG/L (ref 0–8.2)
DIFFERENTIAL METHOD: ABNORMAL
EOSINOPHIL # BLD AUTO: 0 K/UL (ref 0–0.5)
EOSINOPHIL NFR BLD: 1 % (ref 0–8)
ERYTHROCYTE [DISTWIDTH] IN BLOOD BY AUTOMATED COUNT: 11.5 % (ref 11.5–14.5)
ERYTHROCYTE [SEDIMENTATION RATE] IN BLOOD BY WESTERGREN METHOD: <2 MM/HR (ref 0–36)
EST. GFR  (AFRICAN AMERICAN): >60 ML/MIN/1.73 M^2
EST. GFR  (NON AFRICAN AMERICAN): >60 ML/MIN/1.73 M^2
GLUCOSE SERPL-MCNC: 101 MG/DL (ref 70–110)
HCT VFR BLD AUTO: 37.6 % (ref 37–48.5)
HGB BLD-MCNC: 12.8 G/DL (ref 12–16)
IMM GRANULOCYTES # BLD AUTO: 0.01 K/UL (ref 0–0.04)
IMM GRANULOCYTES NFR BLD AUTO: 0.3 % (ref 0–0.5)
LYMPHOCYTES # BLD AUTO: 1.3 K/UL (ref 1–4.8)
LYMPHOCYTES NFR BLD: 33.2 % (ref 18–48)
MCH RBC QN AUTO: 35.2 PG (ref 27–31)
MCHC RBC AUTO-ENTMCNC: 34 G/DL (ref 32–36)
MCV RBC AUTO: 103 FL (ref 82–98)
MONOCYTES # BLD AUTO: 0.3 K/UL (ref 0.3–1)
MONOCYTES NFR BLD: 8.7 % (ref 4–15)
NEUTROPHILS # BLD AUTO: 2.2 K/UL (ref 1.8–7.7)
NEUTROPHILS NFR BLD: 56.3 % (ref 38–73)
NRBC BLD-RTO: 0 /100 WBC
PLATELET # BLD AUTO: 255 K/UL (ref 150–450)
PMV BLD AUTO: 10 FL (ref 9.2–12.9)
POTASSIUM SERPL-SCNC: 4.4 MMOL/L (ref 3.5–5.1)
PROT SERPL-MCNC: 7.8 G/DL (ref 6–8.4)
RBC # BLD AUTO: 3.64 M/UL (ref 4–5.4)
SODIUM SERPL-SCNC: 142 MMOL/L (ref 136–145)
TSH SERPL DL<=0.005 MIU/L-ACNC: 1.53 UIU/ML (ref 0.4–4)
WBC # BLD AUTO: 3.89 K/UL (ref 3.9–12.7)

## 2021-04-15 PROCEDURE — 72050 XR CERVICAL SPINE COMPLETE 5 VIEW: ICD-10-PCS | Mod: 26,,, | Performed by: RADIOLOGY

## 2021-04-15 PROCEDURE — 72110 XR LUMBAR SPINE COMPLETE 5 VIEW: ICD-10-PCS | Mod: 26,,, | Performed by: RADIOLOGY

## 2021-04-15 PROCEDURE — 99214 PR OFFICE/OUTPT VISIT, EST, LEVL IV, 30-39 MIN: ICD-10-PCS | Mod: S$GLB,,, | Performed by: NURSE PRACTITIONER

## 2021-04-15 PROCEDURE — 86803 HEPATITIS C AB TEST: CPT | Performed by: NURSE PRACTITIONER

## 2021-04-15 PROCEDURE — 72110 X-RAY EXAM L-2 SPINE 4/>VWS: CPT | Mod: TC

## 2021-04-15 PROCEDURE — 86140 C-REACTIVE PROTEIN: CPT | Performed by: NURSE PRACTITIONER

## 2021-04-15 PROCEDURE — 99999 PR PBB SHADOW E&M-EST. PATIENT-LVL V: CPT | Mod: PBBFAC,,, | Performed by: NURSE PRACTITIONER

## 2021-04-15 PROCEDURE — 3008F PR BODY MASS INDEX (BMI) DOCUMENTED: ICD-10-PCS | Mod: CPTII,S$GLB,, | Performed by: NURSE PRACTITIONER

## 2021-04-15 PROCEDURE — 82607 VITAMIN B-12: CPT | Performed by: NURSE PRACTITIONER

## 2021-04-15 PROCEDURE — 82306 VITAMIN D 25 HYDROXY: CPT | Performed by: NURSE PRACTITIONER

## 2021-04-15 PROCEDURE — 99999 PR PBB SHADOW E&M-EST. PATIENT-LVL V: ICD-10-PCS | Mod: PBBFAC,,, | Performed by: NURSE PRACTITIONER

## 2021-04-15 PROCEDURE — 72050 X-RAY EXAM NECK SPINE 4/5VWS: CPT | Mod: 26,,, | Performed by: RADIOLOGY

## 2021-04-15 PROCEDURE — 36415 COLL VENOUS BLD VENIPUNCTURE: CPT | Performed by: NURSE PRACTITIONER

## 2021-04-15 PROCEDURE — 85025 COMPLETE CBC W/AUTO DIFF WBC: CPT | Performed by: NURSE PRACTITIONER

## 2021-04-15 PROCEDURE — 72110 X-RAY EXAM L-2 SPINE 4/>VWS: CPT | Mod: 26,,, | Performed by: RADIOLOGY

## 2021-04-15 PROCEDURE — 72050 X-RAY EXAM NECK SPINE 4/5VWS: CPT | Mod: TC

## 2021-04-15 PROCEDURE — 84443 ASSAY THYROID STIM HORMONE: CPT | Performed by: NURSE PRACTITIONER

## 2021-04-15 PROCEDURE — 85652 RBC SED RATE AUTOMATED: CPT | Performed by: NURSE PRACTITIONER

## 2021-04-15 PROCEDURE — 99214 OFFICE O/P EST MOD 30 MIN: CPT | Mod: S$GLB,,, | Performed by: NURSE PRACTITIONER

## 2021-04-15 PROCEDURE — 80053 COMPREHEN METABOLIC PANEL: CPT | Performed by: NURSE PRACTITIONER

## 2021-04-15 PROCEDURE — 3008F BODY MASS INDEX DOCD: CPT | Mod: CPTII,S$GLB,, | Performed by: NURSE PRACTITIONER

## 2021-04-16 ENCOUNTER — TELEPHONE (OUTPATIENT)
Dept: INTERNAL MEDICINE | Facility: CLINIC | Age: 31
End: 2021-04-16

## 2021-04-16 LAB
HCV AB SERPL QL IA: NEGATIVE
VIT B12 SERPL-MCNC: 817 PG/ML (ref 210–950)

## 2021-04-20 ENCOUNTER — TELEPHONE (OUTPATIENT)
Dept: INTERNAL MEDICINE | Facility: CLINIC | Age: 31
End: 2021-04-20

## 2021-04-21 ENCOUNTER — HOSPITAL ENCOUNTER (OUTPATIENT)
Dept: RADIOLOGY | Facility: OTHER | Age: 31
Discharge: HOME | End: 2021-04-21
Attending: NURSE PRACTITIONER
Payer: COMMERCIAL

## 2021-04-21 DIAGNOSIS — R42 DIZZINESS AND GIDDINESS: ICD-10-CM

## 2021-04-21 DIAGNOSIS — G62.9 NEUROPATHY: ICD-10-CM

## 2021-04-21 DIAGNOSIS — M62.81 MUSCLE WEAKNESS (GENERALIZED): ICD-10-CM

## 2021-04-21 DIAGNOSIS — R51.9 NONINTRACTABLE HEADACHE, UNSPECIFIED CHRONICITY PATTERN, UNSPECIFIED HEADACHE TYPE: ICD-10-CM

## 2021-04-21 DIAGNOSIS — R47.89 EPISODE OF CHANGE IN SPEECH: ICD-10-CM

## 2021-04-21 PROCEDURE — 70551 MRI BRAIN WITHOUT CONTRAST: ICD-10-PCS | Mod: 26,,, | Performed by: RADIOLOGY

## 2021-04-21 PROCEDURE — 70551 MRI BRAIN STEM W/O DYE: CPT | Mod: TC

## 2021-04-21 PROCEDURE — 70551 MRI BRAIN STEM W/O DYE: CPT | Mod: 26,,, | Performed by: RADIOLOGY

## 2021-04-22 ENCOUNTER — PATIENT MESSAGE (OUTPATIENT)
Dept: INTERNAL MEDICINE | Facility: CLINIC | Age: 31
End: 2021-04-22

## 2021-04-22 ENCOUNTER — TELEPHONE (OUTPATIENT)
Dept: INTERNAL MEDICINE | Facility: CLINIC | Age: 31
End: 2021-04-22

## 2021-04-22 ENCOUNTER — TELEPHONE (OUTPATIENT)
Dept: NEUROLOGY | Facility: CLINIC | Age: 31
End: 2021-04-22

## 2021-04-22 DIAGNOSIS — G62.9 NEUROPATHY: ICD-10-CM

## 2021-04-22 DIAGNOSIS — M62.81 MUSCLE WEAKNESS (GENERALIZED): Primary | ICD-10-CM

## 2021-04-26 ENCOUNTER — OFFICE VISIT (OUTPATIENT)
Dept: OBSTETRICS AND GYNECOLOGY | Facility: CLINIC | Age: 31
End: 2021-04-26
Payer: COMMERCIAL

## 2021-04-26 VITALS
WEIGHT: 134.25 LBS | HEIGHT: 70 IN | BODY MASS INDEX: 19.22 KG/M2 | SYSTOLIC BLOOD PRESSURE: 112 MMHG | DIASTOLIC BLOOD PRESSURE: 70 MMHG

## 2021-04-26 DIAGNOSIS — Z12.4 SCREENING FOR CERVICAL CANCER: ICD-10-CM

## 2021-04-26 DIAGNOSIS — Z01.419 WELL WOMAN EXAM WITH ROUTINE GYNECOLOGICAL EXAM: Primary | ICD-10-CM

## 2021-04-26 PROCEDURE — 3008F BODY MASS INDEX DOCD: CPT | Mod: CPTII,S$GLB,, | Performed by: OBSTETRICS & GYNECOLOGY

## 2021-04-26 PROCEDURE — 99999 PR PBB SHADOW E&M-EST. PATIENT-LVL II: ICD-10-PCS | Mod: PBBFAC,,, | Performed by: OBSTETRICS & GYNECOLOGY

## 2021-04-26 PROCEDURE — 3008F PR BODY MASS INDEX (BMI) DOCUMENTED: ICD-10-PCS | Mod: CPTII,S$GLB,, | Performed by: OBSTETRICS & GYNECOLOGY

## 2021-04-26 PROCEDURE — 87624 HPV HI-RISK TYP POOLED RSLT: CPT | Performed by: OBSTETRICS & GYNECOLOGY

## 2021-04-26 PROCEDURE — 99999 PR PBB SHADOW E&M-EST. PATIENT-LVL II: CPT | Mod: PBBFAC,,, | Performed by: OBSTETRICS & GYNECOLOGY

## 2021-04-26 PROCEDURE — 88175 CYTOPATH C/V AUTO FLUID REDO: CPT | Performed by: OBSTETRICS & GYNECOLOGY

## 2021-04-26 PROCEDURE — 1126F AMNT PAIN NOTED NONE PRSNT: CPT | Mod: S$GLB,,, | Performed by: OBSTETRICS & GYNECOLOGY

## 2021-04-26 PROCEDURE — 1126F PR PAIN SEVERITY QUANTIFIED, NO PAIN PRESENT: ICD-10-PCS | Mod: S$GLB,,, | Performed by: OBSTETRICS & GYNECOLOGY

## 2021-04-26 PROCEDURE — 99385 PR PREVENTIVE VISIT,NEW,18-39: ICD-10-PCS | Mod: S$GLB,,, | Performed by: OBSTETRICS & GYNECOLOGY

## 2021-04-26 PROCEDURE — 99385 PREV VISIT NEW AGE 18-39: CPT | Mod: S$GLB,,, | Performed by: OBSTETRICS & GYNECOLOGY

## 2021-04-29 LAB
FINAL PATHOLOGIC DIAGNOSIS: NORMAL
Lab: NORMAL

## 2021-05-10 ENCOUNTER — PATIENT MESSAGE (OUTPATIENT)
Dept: OBSTETRICS AND GYNECOLOGY | Facility: CLINIC | Age: 31
End: 2021-05-10

## 2021-05-10 LAB
HPV HR 12 DNA SPEC QL NAA+PROBE: NEGATIVE
HPV16 AG SPEC QL: NEGATIVE
HPV18 DNA SPEC QL NAA+PROBE: NEGATIVE

## 2021-05-13 ENCOUNTER — TELEPHONE (OUTPATIENT)
Dept: NEUROLOGY | Facility: CLINIC | Age: 31
End: 2021-05-13

## 2021-05-20 ENCOUNTER — PROCEDURE VISIT (OUTPATIENT)
Dept: NEUROLOGY | Facility: CLINIC | Age: 31
End: 2021-05-20
Payer: COMMERCIAL

## 2021-05-20 DIAGNOSIS — M62.81 MUSCLE WEAKNESS (GENERALIZED): ICD-10-CM

## 2021-05-20 DIAGNOSIS — G62.9 NEUROPATHY: ICD-10-CM

## 2021-05-20 PROCEDURE — 95910 PR NERVE CONDUCTION STUDY; 7-8 STUDIES: ICD-10-PCS | Mod: S$GLB,,, | Performed by: NEUROMUSCULOSKELETAL MEDICINE & OMM

## 2021-05-20 PROCEDURE — 95886 PR EMG COMPLETE, W/ NERVE CONDUCTION STUDIES, 5+ MUSCLES: ICD-10-PCS | Mod: S$GLB,,, | Performed by: NEUROMUSCULOSKELETAL MEDICINE & OMM

## 2021-05-20 PROCEDURE — 95910 NRV CNDJ TEST 7-8 STUDIES: CPT | Mod: S$GLB,,, | Performed by: NEUROMUSCULOSKELETAL MEDICINE & OMM

## 2021-05-20 PROCEDURE — 95886 MUSC TEST DONE W/N TEST COMP: CPT | Mod: S$GLB,,, | Performed by: NEUROMUSCULOSKELETAL MEDICINE & OMM

## 2021-05-27 ENCOUNTER — PATIENT MESSAGE (OUTPATIENT)
Dept: INTERNAL MEDICINE | Facility: CLINIC | Age: 31
End: 2021-05-27

## 2021-06-07 ENCOUNTER — PATIENT MESSAGE (OUTPATIENT)
Dept: INTERNAL MEDICINE | Facility: CLINIC | Age: 31
End: 2021-06-07

## 2021-06-08 ENCOUNTER — PATIENT MESSAGE (OUTPATIENT)
Dept: INTERNAL MEDICINE | Facility: CLINIC | Age: 31
End: 2021-06-08

## 2021-06-11 ENCOUNTER — TELEPHONE (OUTPATIENT)
Dept: NEUROLOGY | Facility: CLINIC | Age: 31
End: 2021-06-11

## 2021-07-26 ENCOUNTER — PATIENT MESSAGE (OUTPATIENT)
Dept: INTERNAL MEDICINE | Facility: CLINIC | Age: 31
End: 2021-07-26

## 2021-07-30 ENCOUNTER — PATIENT MESSAGE (OUTPATIENT)
Dept: INTERNAL MEDICINE | Facility: CLINIC | Age: 31
End: 2021-07-30

## 2021-09-03 ENCOUNTER — PATIENT MESSAGE (OUTPATIENT)
Dept: NEUROLOGY | Facility: CLINIC | Age: 31
End: 2021-09-03

## 2022-05-23 ENCOUNTER — TELEPHONE (OUTPATIENT)
Dept: OBSTETRICS AND GYNECOLOGY | Facility: CLINIC | Age: 32
End: 2022-05-23
Payer: COMMERCIAL

## 2022-05-23 DIAGNOSIS — N83.209 RUPTURED OVARIAN CYST: Primary | ICD-10-CM

## 2022-05-23 NOTE — TELEPHONE ENCOUNTER
Spoke with patient in regards to cyst advised patient that she needs an US. Patient stated that she was boarding a plan today and would like to come in asap patient is schedule to se Dr. Espinoza tomorrow order is in for US.

## 2022-05-23 NOTE — TELEPHONE ENCOUNTER
----- Message from Ludmila Gaspar MA sent at 5/23/2022  1:57 PM CDT -----    ----- Message -----  From: Becky Ochoa MA  Sent: 5/23/2022   1:46 PM CDT  To: Ludmila Gaspar MA    She said she had a cyst rupture she would like an us to see if anything is going on.   ----- Message -----  From: Ludmila Gaspar MA  Sent: 5/23/2022   1:34 PM CDT  To: Becky Ochoa MA    What kind of us she need  ----- Message -----  From: Carolee Godfrey  Sent: 5/23/2022   1:14 PM CDT  To: Alexis Brownlee Staff    Pt is calling for US order  Pt is boarding a plane and will be on a plane until 5pm, Pt states she will try to contact office at 8am tomorrow  Pt can be contacted at [phone]

## 2022-05-24 ENCOUNTER — LAB VISIT (OUTPATIENT)
Dept: LAB | Facility: HOSPITAL | Age: 32
End: 2022-05-24
Attending: OBSTETRICS & GYNECOLOGY
Payer: COMMERCIAL

## 2022-05-24 ENCOUNTER — OFFICE VISIT (OUTPATIENT)
Dept: OBSTETRICS AND GYNECOLOGY | Facility: CLINIC | Age: 32
End: 2022-05-24
Payer: COMMERCIAL

## 2022-05-24 VITALS
HEIGHT: 70 IN | SYSTOLIC BLOOD PRESSURE: 124 MMHG | WEIGHT: 137.56 LBS | BODY MASS INDEX: 19.69 KG/M2 | DIASTOLIC BLOOD PRESSURE: 75 MMHG

## 2022-05-24 DIAGNOSIS — N83.209 RUPTURED OVARIAN CYST: ICD-10-CM

## 2022-05-24 DIAGNOSIS — Z32.00 POSSIBLE PREGNANCY, NOT CONFIRMED: ICD-10-CM

## 2022-05-24 DIAGNOSIS — N83.209 RUPTURED OVARIAN CYST: Primary | ICD-10-CM

## 2022-05-24 LAB
B-HCG UR QL: NEGATIVE
BASOPHILS # BLD AUTO: 0.01 K/UL (ref 0–0.2)
BASOPHILS NFR BLD: 0.3 % (ref 0–1.9)
CTP QC/QA: YES
DIFFERENTIAL METHOD: ABNORMAL
EOSINOPHIL # BLD AUTO: 0 K/UL (ref 0–0.5)
EOSINOPHIL NFR BLD: 0.3 % (ref 0–8)
ERYTHROCYTE [DISTWIDTH] IN BLOOD BY AUTOMATED COUNT: 11.9 % (ref 11.5–14.5)
HCT VFR BLD AUTO: 34.5 % (ref 37–48.5)
HGB BLD-MCNC: 11.6 G/DL (ref 12–16)
IMM GRANULOCYTES # BLD AUTO: 0.01 K/UL (ref 0–0.04)
IMM GRANULOCYTES NFR BLD AUTO: 0.3 % (ref 0–0.5)
LYMPHOCYTES # BLD AUTO: 1.1 K/UL (ref 1–4.8)
LYMPHOCYTES NFR BLD: 35.1 % (ref 18–48)
MCH RBC QN AUTO: 35.6 PG (ref 27–31)
MCHC RBC AUTO-ENTMCNC: 33.6 G/DL (ref 32–36)
MCV RBC AUTO: 106 FL (ref 82–98)
MONOCYTES # BLD AUTO: 0.3 K/UL (ref 0.3–1)
MONOCYTES NFR BLD: 10.4 % (ref 4–15)
NEUTROPHILS # BLD AUTO: 1.6 K/UL (ref 1.8–7.7)
NEUTROPHILS NFR BLD: 53.6 % (ref 38–73)
NRBC BLD-RTO: 0 /100 WBC
PLATELET # BLD AUTO: 198 K/UL (ref 150–450)
PMV BLD AUTO: 10.5 FL (ref 9.2–12.9)
RBC # BLD AUTO: 3.26 M/UL (ref 4–5.4)
WBC # BLD AUTO: 2.99 K/UL (ref 3.9–12.7)

## 2022-05-24 PROCEDURE — 3074F PR MOST RECENT SYSTOLIC BLOOD PRESSURE < 130 MM HG: ICD-10-PCS | Mod: CPTII,S$GLB,, | Performed by: OBSTETRICS & GYNECOLOGY

## 2022-05-24 PROCEDURE — 3008F PR BODY MASS INDEX (BMI) DOCUMENTED: ICD-10-PCS | Mod: CPTII,S$GLB,, | Performed by: OBSTETRICS & GYNECOLOGY

## 2022-05-24 PROCEDURE — 3074F SYST BP LT 130 MM HG: CPT | Mod: CPTII,S$GLB,, | Performed by: OBSTETRICS & GYNECOLOGY

## 2022-05-24 PROCEDURE — 36415 COLL VENOUS BLD VENIPUNCTURE: CPT | Mod: PN | Performed by: OBSTETRICS & GYNECOLOGY

## 2022-05-24 PROCEDURE — 1160F PR REVIEW ALL MEDS BY PRESCRIBER/CLIN PHARMACIST DOCUMENTED: ICD-10-PCS | Mod: CPTII,S$GLB,, | Performed by: OBSTETRICS & GYNECOLOGY

## 2022-05-24 PROCEDURE — 1159F PR MEDICATION LIST DOCUMENTED IN MEDICAL RECORD: ICD-10-PCS | Mod: CPTII,S$GLB,, | Performed by: OBSTETRICS & GYNECOLOGY

## 2022-05-24 PROCEDURE — 99213 PR OFFICE/OUTPT VISIT, EST, LEVL III, 20-29 MIN: ICD-10-PCS | Mod: S$GLB,,, | Performed by: OBSTETRICS & GYNECOLOGY

## 2022-05-24 PROCEDURE — 99999 PR PBB SHADOW E&M-EST. PATIENT-LVL III: CPT | Mod: PBBFAC,,, | Performed by: OBSTETRICS & GYNECOLOGY

## 2022-05-24 PROCEDURE — 1160F RVW MEDS BY RX/DR IN RCRD: CPT | Mod: CPTII,S$GLB,, | Performed by: OBSTETRICS & GYNECOLOGY

## 2022-05-24 PROCEDURE — 1159F MED LIST DOCD IN RCRD: CPT | Mod: CPTII,S$GLB,, | Performed by: OBSTETRICS & GYNECOLOGY

## 2022-05-24 PROCEDURE — 3008F BODY MASS INDEX DOCD: CPT | Mod: CPTII,S$GLB,, | Performed by: OBSTETRICS & GYNECOLOGY

## 2022-05-24 PROCEDURE — 81025 POCT URINE PREGNANCY: ICD-10-PCS | Mod: S$GLB,,, | Performed by: OBSTETRICS & GYNECOLOGY

## 2022-05-24 PROCEDURE — 99999 PR PBB SHADOW E&M-EST. PATIENT-LVL III: ICD-10-PCS | Mod: PBBFAC,,, | Performed by: OBSTETRICS & GYNECOLOGY

## 2022-05-24 PROCEDURE — 81025 URINE PREGNANCY TEST: CPT | Mod: S$GLB,,, | Performed by: OBSTETRICS & GYNECOLOGY

## 2022-05-24 PROCEDURE — 3078F DIAST BP <80 MM HG: CPT | Mod: CPTII,S$GLB,, | Performed by: OBSTETRICS & GYNECOLOGY

## 2022-05-24 PROCEDURE — 99213 OFFICE O/P EST LOW 20 MIN: CPT | Mod: S$GLB,,, | Performed by: OBSTETRICS & GYNECOLOGY

## 2022-05-24 PROCEDURE — 3078F PR MOST RECENT DIASTOLIC BLOOD PRESSURE < 80 MM HG: ICD-10-PCS | Mod: CPTII,S$GLB,, | Performed by: OBSTETRICS & GYNECOLOGY

## 2022-05-24 PROCEDURE — 85025 COMPLETE CBC W/AUTO DIFF WBC: CPT | Performed by: OBSTETRICS & GYNECOLOGY

## 2022-05-24 RX ORDER — NAPROXEN 500 MG/1
500 TABLET ORAL 2 TIMES DAILY WITH MEALS
Qty: 30 TABLET | Refills: 2 | Status: SHIPPED | OUTPATIENT
Start: 2022-05-24 | End: 2022-06-09

## 2022-05-24 NOTE — PROGRESS NOTES
"Chief Complaint   Patient presents with    Ovarian Cyst       HPI:  32 y.o. G0 here today for pelvic pain. Was [place] the [facility] [place] Sunday, was experiencing "normal" pelvic pain that she typically does on the first day of her cycle, then experienced acute onset severe abdominal pain, became diaphoretic, stayed in airport restroom for an hour until pain subsided. Fetal position improved the pain. Vomited yellow bile, also had mild diarrhea. Pain happened like this once before, had just taken a Plan B at that time. Uses condoms consistently for contraception. Has a h/o PCOS but has never had imaging to confirm the presence of ovarian cyst.    Still with abdominal soreness. Some mild nausea in the mornings, no vomiting since, but does feel shaky and weak. Denies abnormal discharge, vaginal odor, itching, irritation, urinary complaints, fever, chills, or other concerns. UPT negative in clinic.     LMP Dates from Last 1 Encounters:   LMP: 05/22/2022       Labs / Significant Studies  Pap (4/2021): NILM/HPV neg    No visits with results within 3 Month(s) from this visit.   Latest known visit with results is:   Office Visit on 04/26/2021   Component Date Value Ref Range Status    HPV other High Risk types, PCR 04/26/2021 Negative  Negative Final    Comment: Other HPV genotypes include:   31,33,35,39,45,51,52,56,58,59,66 and 68.      HPV High Risk type 16, PCR 04/26/2021 Negative  Negative Final    HPV High Risk type 18, PCR 04/26/2021 Negative  Negative Final    Final Pathologic Diagnosis 04/26/2021    Final                    Value:Specimen Adequacy  Satisfactory for interpretation. Endocervical component is present.  [place] Category  Negative for intraepithelial lesion or malignancy.      Interp By SALVATORE Johnson (ASCP), Signed on 04/29/2021 at 15:54    Disclaimer 04/26/2021    Final                    Value:The Pap smear is a screening test that aids in the detection of cervical  cancer and cancer " precursors. Both false positive and false negative results  can occur. The test should be used at regular intervals, and positive results  should be confirmed before definitive therapy.  This liquid based specimen is processed using the  or  Thin PrepPAP  System. This specimen has been analyzed by the ThinPrep Imaging System  ([employer]), an automated imaging and review system which assists  the laboratory in evaluating cells on ThinPrep PAP tests. Following automated  imaging, selected fields from every slide are reviewed by a cytotechnologist  and/or pathologist.  Screening was performed at [facility], [address] [place] [id number].          Past Medical History:   Diagnosis Date    Mononucleosis      Past Surgical History:   Procedure Laterality Date    NO PAST SURGERIES         Current Outpatient Medications:     naproxen (NAPROSYN) 500 MG tablet, Take 1 tablet (500 mg total) by mouth 2 (two) times daily with meals., Disp: 30 tablet, Rfl: 2  Review of patient's allergies indicates:  No Known Allergies  OB History   No obstetric history on file.     Social History     Tobacco Use    Smoking status: Current Some Day Smoker     Types: Vaping with nicotine     Start date: 2019    Smokeless tobacco: Never Used   Substance Use Topics    Alcohol use: Yes     Comment: 4 drinks per week     Drug use: Never     Family History   Problem Relation Age of Onset    No Known Problems Mother     Lymphoma Father     Cirrhosis Father     Brain cancer Maternal Grandfather         possible brain tumor        Review of Systems   Negative except as in HPI    Physical Exam   Vitals:    05/24/22 0917   BP: 124/75     Body mass index is 19.74 kg/m².    Physical Exam  Constitutional:       General: She is not in acute distress.     Appearance: Normal appearance.   HENT:      Head: Normocephalic and atraumatic.   Pulmonary:      Effort: Pulmonary effort  is normal.   Musculoskeletal:         General: Normal range of motion.      Cervical back: Normal range of motion.   Neurological:      General: No focal deficit present.      Mental Status: She is alert and oriented to person, place, and time.   Skin:     General: Skin is warm and dry.   Psychiatric:         Mood and Affect: Mood normal.         Behavior: Behavior normal.         Thought Content: Thought content normal.        Labs reviewed: Pap, HPV, UPT, GC/CT    ASSESSMENT:   Patient Active Problem List   Diagnosis    Positive TO (antinuclear antibody)    Upper back pain    Muscle weakness (generalized)    Ruptured ovarian cyst       PLAN:  Problem List Items Addressed This Visit        Renal/    Ruptured ovarian cyst - Primary    Current Assessment & Plan     Patient history c/w ruptured ovarian cyst. Feeling better today overall but still with soreness and feeling shaky. No other symptoms. Discussed ovulation suppression to reduce future cyst formation. Discussed possible TVUS to determine if there is another cause of pain, but patient declines at this time and wishes to check on WBC count and H/H. CBC ordered today, will f/u results. RTC 1-2 months for annual exam and to ensure resolution of symptoms.            Relevant Medications    naproxen (NAPROSYN) 500 MG tablet    Other Relevant Orders    CBC Auto Differential           Total time spent on this encounter was 20 minutes.  This includes preparing to see the patient;  obtaining/reviewing separately obtained history;  performing a medical exam and/or evaluation;   counseling/educating the patient;  ordering medications, tests, of procedures;   referring/communicating with other health care professionals;  EMR documentation;  interpreting/communicating results to the patient;  and/or care coordination.    Follow up in about 4 weeks (around 6/21/2022) for Annual.       Torrie Espinoza MD  Department of Obstetrics & Gynecology  [facility]  [facility]

## 2022-05-24 NOTE — ASSESSMENT & PLAN NOTE
Patient history c/w ruptured ovarian cyst. Feeling better today overall but still with soreness and feeling shaky. No other symptoms. Discussed ovulation suppression to reduce future cyst formation. Discussed possible TVUS to determine if there is another cause of pain, but patient declines at this time and wishes to check on WBC count and H/H. CBC ordered today, will f/u results. RTC 1-2 months for annual exam and to ensure resolution of symptoms.

## 2022-05-25 ENCOUNTER — PATIENT MESSAGE (OUTPATIENT)
Dept: INTERNAL MEDICINE | Facility: CLINIC | Age: 32
End: 2022-05-25
Payer: COMMERCIAL

## 2022-05-25 DIAGNOSIS — Z00.00 ANNUAL PHYSICAL EXAM: Primary | ICD-10-CM

## 2022-05-25 DIAGNOSIS — D53.9 MACROCYTIC ANEMIA: ICD-10-CM

## 2022-05-25 DIAGNOSIS — Z01.89 ROUTINE LAB DRAW: ICD-10-CM

## 2022-05-25 DIAGNOSIS — D72.819 LEUKOPENIA, UNSPECIFIED TYPE: ICD-10-CM

## 2022-05-27 ENCOUNTER — PATIENT MESSAGE (OUTPATIENT)
Dept: OBSTETRICS AND GYNECOLOGY | Facility: CLINIC | Age: 32
End: 2022-05-27
Payer: COMMERCIAL

## 2022-06-01 ENCOUNTER — HOSPITAL ENCOUNTER (EMERGENCY)
Facility: HOSPITAL | Age: 32
Discharge: HOME | End: 2022-06-01
Attending: EMERGENCY MEDICINE
Payer: COMMERCIAL

## 2022-06-01 ENCOUNTER — NURSE TRIAGE (OUTPATIENT)
Dept: ADMINISTRATIVE | Facility: CLINIC | Age: 32
End: 2022-06-01
Payer: COMMERCIAL

## 2022-06-01 VITALS
HEART RATE: 58 BPM | TEMPERATURE: 98 F | DIASTOLIC BLOOD PRESSURE: 79 MMHG | OXYGEN SATURATION: 99 % | WEIGHT: 135 LBS | RESPIRATION RATE: 16 BRPM | SYSTOLIC BLOOD PRESSURE: 118 MMHG | BODY MASS INDEX: 19.33 KG/M2 | HEIGHT: 70 IN

## 2022-06-01 DIAGNOSIS — R07.9 CHEST PAIN: ICD-10-CM

## 2022-06-01 DIAGNOSIS — R20.0 NUMBNESS: ICD-10-CM

## 2022-06-01 DIAGNOSIS — S46.812A STRAIN OF LEFT TRAPEZIUS MUSCLE, INITIAL ENCOUNTER: ICD-10-CM

## 2022-06-01 DIAGNOSIS — R20.2 PARESTHESIAS: Primary | ICD-10-CM

## 2022-06-01 LAB
ALBUMIN SERPL BCP-MCNC: 4.5 G/DL (ref 3.5–5.2)
ALP SERPL-CCNC: 50 U/L (ref 55–135)
ALT SERPL W/O P-5'-P-CCNC: 9 U/L (ref 10–44)
ANION GAP SERPL CALC-SCNC: 9 MMOL/L (ref 8–16)
AST SERPL-CCNC: 15 U/L (ref 10–40)
B-HCG UR QL: NEGATIVE
BASOPHILS # BLD AUTO: 0.02 K/UL (ref 0–0.2)
BASOPHILS NFR BLD: 0.6 % (ref 0–1.9)
BILIRUB SERPL-MCNC: 1 MG/DL (ref 0.1–1)
BUN SERPL-MCNC: 7 MG/DL (ref 6–20)
CALCIUM SERPL-MCNC: 9.3 MG/DL (ref 8.7–10.5)
CHLORIDE SERPL-SCNC: 105 MMOL/L (ref 95–110)
CO2 SERPL-SCNC: 26 MMOL/L (ref 23–29)
CREAT SERPL-MCNC: 0.7 MG/DL (ref 0.5–1.4)
CTP QC/QA: YES
CTP QC/QA: YES
DIFFERENTIAL METHOD: ABNORMAL
EOSINOPHIL # BLD AUTO: 0 K/UL (ref 0–0.5)
EOSINOPHIL NFR BLD: 0.6 % (ref 0–8)
ERYTHROCYTE [DISTWIDTH] IN BLOOD BY AUTOMATED COUNT: 11.6 % (ref 11.5–14.5)
EST. GFR  (AFRICAN AMERICAN): >60 ML/MIN/1.73 M^2
EST. GFR  (NON AFRICAN AMERICAN): >60 ML/MIN/1.73 M^2
GLUCOSE SERPL-MCNC: 102 MG/DL (ref 70–110)
HCT VFR BLD AUTO: 36 % (ref 37–48.5)
HGB BLD-MCNC: 12.2 G/DL (ref 12–16)
IMM GRANULOCYTES # BLD AUTO: 0.01 K/UL (ref 0–0.04)
IMM GRANULOCYTES NFR BLD AUTO: 0.3 % (ref 0–0.5)
LYMPHOCYTES # BLD AUTO: 1.3 K/UL (ref 1–4.8)
LYMPHOCYTES NFR BLD: 37.2 % (ref 18–48)
MCH RBC QN AUTO: 34.8 PG (ref 27–31)
MCHC RBC AUTO-ENTMCNC: 33.9 G/DL (ref 32–36)
MCV RBC AUTO: 103 FL (ref 82–98)
MONOCYTES # BLD AUTO: 0.4 K/UL (ref 0.3–1)
MONOCYTES NFR BLD: 10.4 % (ref 4–15)
NEUTROPHILS # BLD AUTO: 1.8 K/UL (ref 1.8–7.7)
NEUTROPHILS NFR BLD: 50.9 % (ref 38–73)
NRBC BLD-RTO: 0 /100 WBC
PLATELET # BLD AUTO: 197 K/UL (ref 150–450)
PMV BLD AUTO: 9.4 FL (ref 9.2–12.9)
POTASSIUM SERPL-SCNC: 4.4 MMOL/L (ref 3.5–5.1)
PROT SERPL-MCNC: 7.1 G/DL (ref 6–8.4)
RBC # BLD AUTO: 3.51 M/UL (ref 4–5.4)
SARS-COV-2 RDRP RESP QL NAA+PROBE: NEGATIVE
SODIUM SERPL-SCNC: 140 MMOL/L (ref 136–145)
TSH SERPL DL<=0.005 MIU/L-ACNC: 1.15 UIU/ML (ref 0.4–4)
WBC # BLD AUTO: 3.55 K/UL (ref 3.9–12.7)

## 2022-06-01 PROCEDURE — 85025 COMPLETE CBC W/AUTO DIFF WBC: CPT | Performed by: PHYSICIAN ASSISTANT

## 2022-06-01 PROCEDURE — 25000003 PHARM REV CODE 250: Performed by: EMERGENCY MEDICINE

## 2022-06-01 PROCEDURE — 87389 HIV-1 AG W/HIV-1&-2 AB AG IA: CPT | Performed by: EMERGENCY MEDICINE

## 2022-06-01 PROCEDURE — U0002 COVID-19 LAB TEST NON-CDC: HCPCS | Performed by: PHYSICIAN ASSISTANT

## 2022-06-01 PROCEDURE — 93010 ELECTROCARDIOGRAM REPORT: CPT | Mod: ,,, | Performed by: INTERNAL MEDICINE

## 2022-06-01 PROCEDURE — 25000003 PHARM REV CODE 250: Performed by: PHYSICIAN ASSISTANT

## 2022-06-01 PROCEDURE — 84443 ASSAY THYROID STIM HORMONE: CPT | Performed by: PHYSICIAN ASSISTANT

## 2022-06-01 PROCEDURE — 86803 HEPATITIS C AB TEST: CPT | Performed by: EMERGENCY MEDICINE

## 2022-06-01 PROCEDURE — 80053 COMPREHEN METABOLIC PANEL: CPT | Performed by: PHYSICIAN ASSISTANT

## 2022-06-01 PROCEDURE — 99285 EMERGENCY DEPT VISIT HI MDM: CPT | Mod: CS,,, | Performed by: EMERGENCY MEDICINE

## 2022-06-01 PROCEDURE — 93010 EKG 12-LEAD: ICD-10-PCS | Mod: ,,, | Performed by: INTERNAL MEDICINE

## 2022-06-01 PROCEDURE — 93005 ELECTROCARDIOGRAM TRACING: CPT

## 2022-06-01 PROCEDURE — 99285 EMERGENCY DEPT VISIT HI MDM: CPT | Mod: 25

## 2022-06-01 PROCEDURE — 81025 URINE PREGNANCY TEST: CPT | Performed by: PHYSICIAN ASSISTANT

## 2022-06-01 PROCEDURE — 99285 PR EMERGENCY DEPT VISIT,LEVEL V: ICD-10-PCS | Mod: CS,,, | Performed by: EMERGENCY MEDICINE

## 2022-06-01 PROCEDURE — 96360 HYDRATION IV INFUSION INIT: CPT

## 2022-06-01 RX ORDER — LIDOCAINE 50 MG/G
1 PATCH TOPICAL
Status: DISCONTINUED | OUTPATIENT
Start: 2022-06-01 | End: 2022-06-01 | Stop reason: HOSPADM

## 2022-06-01 RX ORDER — DROPERIDOL 2.5 MG/ML
0.62 INJECTION, SOLUTION INTRAMUSCULAR; INTRAVENOUS
Status: DISCONTINUED | OUTPATIENT
Start: 2022-06-01 | End: 2022-06-01 | Stop reason: HOSPADM

## 2022-06-01 RX ADMIN — SODIUM CHLORIDE 500 ML: 0.9 INJECTION, SOLUTION INTRAVENOUS at 11:06

## 2022-06-01 RX ADMIN — LIDOCAINE 1 PATCH: 50 PATCH CUTANEOUS at 11:06

## 2022-06-01 NOTE — ED NOTES
Patient states heart racing x 3 days, reports daily numbness in left arm, chills, woke up this am shaking, muscle cramps in chest that is relieved with putting pressure on it. PCP concerned with B 12 anemia.

## 2022-06-01 NOTE — ED NOTES
"Patient identifiers verified and correct for MS Duncan  C/C: Heart pounding SEE NN  APPEARANCE: awake and alert in NAD.  SKIN: warm, dry and intact. No breakdown or bruising.  MUSCULOSKELETAL: Patient moving all extremities spontaneously, no obvious swelling or deformities noted. Ambulates independently.  RESPIRATORY: Positive shortness of breath.Respirations unlabored. Denies cough Denies fevers  CARDIAC: Denies CP, 2+ distal pulses; no peripheral edema reports heart "pounding"   ABDOMEN: S/ND/NT, Denies nausea  : voids spontaneously, denies difficulty  Neurologic: AAO x 4; follows commands equal strength in all extremities; denies numbness/tingling. Denies dizziness  Denies weakness    "

## 2022-06-01 NOTE — ED PROVIDER NOTES
"Encounter Date: 6/1/2022       History     Chief Complaint   Patient presents with    Numbness     Started yr ago with numbness, chest pain daily, all ext went numb this morning and cont with numbness to L arm, general weakness, vomited     This is a 32 year old female with a PMH of mononucleosis and ovarian cyst presenting to the ED with a chief complaint of numbness. She reports numbness in the left arm for the last year. This morning she felt numbness and tingling in all of her extremities. The right arm and leg paresthesias have resolved but the left arm paresthesias persist. Additional concern is of palpitations, chest pain, headaches, tinnitus. She has been evaluated for the arm numbness with an EMG and has undergone MRI brain 4/15/21 which "demonstrates no evidence of acute intracranial abnormality.  Specifically, no evidence of acute infarction." She mentions rupture of a left ovarian cyst recently while traveling, was double over in the airport diaphoretic in severe pain for over an hour, was unable to board her flight. The symptoms spontaneously resolved and she did not seek medical care at that time. Upon return saw PCP and had labs showing mild anemia. She researched B vitamin deficiency and found that her symptoms are in line with that. She has been on B vitamin supplementation but has concerns she is not absorbing it, and desires B vitamin infusion today. She denies fever, URI symptoms, SOB, vomiting, abdominal pain, diarrhea, vaginal bleeding or discharge. She vapes tobacco, denies exogenous hormones. Flew back from [place] 10 days ago.        Review of patient's allergies indicates:  No Known Allergies  Past Medical History:   Diagnosis Date    Mononucleosis     Ovarian cyst      Past Surgical History:   Procedure Laterality Date    NO PAST SURGERIES       Family History   Problem Relation Age of Onset    No Known Problems Mother     Lymphoma Father     Cirrhosis Father     Brain cancer Maternal " Grandfather         possible brain tumor      Social History     Tobacco Use    Smoking status: Current Some Day Smoker     Types: Vaping with nicotine     Start date: 2019    Smokeless tobacco: Never Used   Substance Use Topics    Alcohol use: Yes     Comment: 4 drinks per week     Drug use: Never     Review of Systems   Constitutional: Positive for chills. Negative for fever.   HENT: Negative for sore throat.    Eyes: Negative for visual disturbance.   Respiratory: Negative for shortness of breath.    Cardiovascular: Positive for chest pain and palpitations.   Gastrointestinal: Positive for nausea and vomiting. Negative for abdominal pain.   Genitourinary: Negative for dysuria.   Musculoskeletal: Negative for back pain.   Skin: Negative for rash.   Neurological: Positive for numbness. Negative for weakness and headaches.   Hematological: Does not bruise/bleed easily.       Physical Exam     Initial Vitals [06/01/22 0933]   BP Pulse Resp Temp SpO2   (!) 147/88 76 18 98.4 °F (36.9 °C) 100 %      MAP       --         Physical Exam    Constitutional: She appears well-developed and well-nourished. No distress.   HENT:   Head: Normocephalic and atraumatic.   Right Ear: Tympanic membrane and ear canal normal.   Left Ear: Tympanic membrane and ear canal normal.   Nose: Nose normal.   Mouth/Throat: Uvula is midline, oropharynx is clear and moist and mucous membranes are normal.   Eyes: Conjunctivae and EOM are normal. Pupils are equal, round, and reactive to light.   Neck: Neck supple.   Normal range of motion.   Full passive range of motion without pain.     Cardiovascular: Normal rate, regular rhythm and normal heart sounds.   Pulmonary/Chest: Breath sounds normal. No respiratory distress. She has no wheezes. She has no rhonchi. She has no rales.   Abdominal: Abdomen is soft. Bowel sounds are normal. There is no abdominal tenderness.   Musculoskeletal:      Cervical back: Full passive range of motion without pain,  normal range of motion and neck supple.        Back:      Neurological: She is alert and oriented to person, place, and time. She has normal strength and normal reflexes. No cranial nerve deficit or sensory deficit. Coordination and gait normal.   Skin: Skin is warm and dry. No rash noted.         ED Course   Procedures  Labs Reviewed   COMPREHENSIVE METABOLIC PANEL - Abnormal; Notable for the following components:       Result Value    Alkaline Phosphatase 50 (*)     ALT 9 (*)     All other components within normal limits   CBC W/ AUTO DIFFERENTIAL - Abnormal; Notable for the following components:    WBC 3.55 (*)     RBC 3.51 (*)     Hematocrit 36.0 (*)      (*)     MCH 34.8 (*)     All other components within normal limits   TSH   HIV 1 / 2 ANTIBODY   HEPATITIS C ANTIBODY   POCT URINE PREGNANCY   SARS-COV-2 RDRP GENE    Narrative:     This test utilizes isothermal nucleic acid amplification   technology to detect the SARS-CoV-2 RdRp nucleic acid segment.   The analytical sensitivity (limit of detection) is 125 genome   equivalents/mL.   A POSITIVE result implies infection with the SARS-CoV-2 virus;   the patient is presumed to be contagious.     A NEGATIVE result means that SARS-CoV-2 nucleic acids are not   present above the limit of detection. A NEGATIVE result should be   treated as presumptive. It does not rule out the possibility of   COVID-19 and should not be the sole basis for treatment decisions.   If COVID-19 is strongly suspected based on clinical and exposure   history, re-testing using an alternate molecular assay should be   considered.   This test is only for use under the Food and Drug   Administration s Emergency Use Authorization (EUA).   Commercial kits are provided by [employer].   Performance characteristics of the EUA have been independently   verified by [facility] Department of   Pathology and Laboratory Medicine.    _________________________________________________________________   The authorized Fact Sheet for Healthcare Providers and the authorized Fact   Sheet for Patients of the ID NOW COVID-19 are available on the FDA   website:     https://www.fda.gov/media/319192/download  https://www.fda.gov/media/989349/download                ECG Results          EKG 12-lead (Final result)  Result time 06/01/22 10:38:47    Final result by Interface, Lab In [facility] (06/01/22 10:38:47)                 Narrative:    Test Reason : R20.0,    Vent. Rate : 065 BPM     Atrial Rate : 065 BPM     P-R Int : 144 ms          QRS Dur : 088 ms      QT Int : 384 ms       P-R-T Axes : 077 090 072 degrees     QTc Int : 399 ms    Normal sinus rhythm  Rightward axis  Nonspecific ST and/or T wave abnormalities  Abnormal ECG  No previous ECGs available  Confirmed by Antelmo Rodrigues MD ([id number]) on 6/1/2022 10:38:36 AM    Referred By:             Confirmed By:Antelmo Rodrigues MD                            Imaging Results          X-Ray Chest PA And Lateral (Final result)  Result time 06/01/22 11:43:02    Final result by Adolfo Foster III, MD (06/01/22 11:43:02)                 Impression:      No acute process seen.      Electronically signed by: Adolfo Foster MD  Date:    06/01/2022  Time:    11:43             Narrative:    EXAMINATION:  XR CHEST PA AND LATERAL    CLINICAL HISTORY:  Chest pain, unspecified    FINDINGS:  Chest two views: Heart size is normal.  Lungs are clear and the bones show nothing unusual.                                 Medications   sodium chloride 0.9% bolus 500 mL (0 mLs Intravenous Stopped 6/1/22 1217)     Medical Decision Making:   History:   Old Medical Records: I decided to obtain old medical records.  Clinical Tests:   Lab Tests: Ordered and Reviewed  Radiological Study: Ordered and Reviewed       APC / Resident Notes:   32 y.o. year old female presenting with paresthesias and palpitations.    DDx includes but is not limited  to cervical radiculopathy, vitamin deficiency/pernicious anemia, neuropathic pain, thyroid dysfunction, arrhythmia, anxiety.  I considered but do not suspect PE, she is PERC negative.    ED course  Covid negative  Urine pregnancy negative  Labs stable H&H 12/36, , stable electrolytes and renal function. Normal TSH.  CXR no acute process.   EKG normal sinus rhythm rate of 65.    Plan   Reassurance  Trapezius stretches/warm compresses, NSAIDs prn  Advised PCP f/u    Discussed findings and plan with patient who verbalized understanding and agrees with the plan and course of treatment. Return to ED precautions discussed. Patient is stable for discharge. I discussed the care of this patient with my supervising physician.                   Clinical Impression:   Final diagnoses:  [R20.0] Numbness  [R07.9] Chest pain  [R20.2] Paresthesias (Primary)  [S46.812A] Strain of left trapezius muscle, initial encounter          ED Disposition Condition    Discharge Stable        ED Prescriptions     None        Follow-up Information     Follow up With Specialties Details Why Contact Info    Ernestina Pop NP Family Medicine Schedule an appointment as soon as possible for a visit   [address]  [facility] [id number]  [phone]             Gabriella Lombardi PA-C  06/01/22 4560

## 2022-06-01 NOTE — Clinical Note
"Rachel"Pam Duncan was seen and treated in our emergency department on 6/1/2022.  She may return to work on 06/02/2022.       If you have any questions or concerns, please don't hesitate to call.      DR Granger/ REBEKAH Marlow    "

## 2022-06-02 LAB
HCV AB SERPL QL IA: NEGATIVE
HIV 1+2 AB+HIV1 P24 AG SERPL QL IA: NEGATIVE

## 2022-06-04 ENCOUNTER — LAB VISIT (OUTPATIENT)
Dept: LAB | Facility: HOSPITAL | Age: 32
End: 2022-06-04
Payer: COMMERCIAL

## 2022-06-04 DIAGNOSIS — D72.819 LEUKOPENIA, UNSPECIFIED TYPE: ICD-10-CM

## 2022-06-04 DIAGNOSIS — Z00.00 ANNUAL PHYSICAL EXAM: ICD-10-CM

## 2022-06-04 DIAGNOSIS — Z01.89 ROUTINE LAB DRAW: ICD-10-CM

## 2022-06-04 DIAGNOSIS — D53.9 MACROCYTIC ANEMIA: ICD-10-CM

## 2022-06-04 LAB
25(OH)D3+25(OH)D2 SERPL-MCNC: 79 NG/ML (ref 30–96)
ALBUMIN SERPL BCP-MCNC: 4.7 G/DL (ref 3.5–5.2)
ALP SERPL-CCNC: 49 U/L (ref 55–135)
ALT SERPL W/O P-5'-P-CCNC: 9 U/L (ref 10–44)
ANION GAP SERPL CALC-SCNC: 10 MMOL/L (ref 8–16)
AST SERPL-CCNC: 16 U/L (ref 10–40)
BASOPHILS # BLD AUTO: 0.01 K/UL (ref 0–0.2)
BASOPHILS NFR BLD: 0.3 % (ref 0–1.9)
BILIRUB SERPL-MCNC: 0.6 MG/DL (ref 0.1–1)
BUN SERPL-MCNC: 8 MG/DL (ref 6–20)
CALCIUM SERPL-MCNC: 9.6 MG/DL (ref 8.7–10.5)
CHLORIDE SERPL-SCNC: 100 MMOL/L (ref 95–110)
CHOLEST SERPL-MCNC: 192 MG/DL (ref 120–199)
CHOLEST/HDLC SERPL: 2.1 {RATIO} (ref 2–5)
CO2 SERPL-SCNC: 27 MMOL/L (ref 23–29)
CREAT SERPL-MCNC: 0.8 MG/DL (ref 0.5–1.4)
DIFFERENTIAL METHOD: ABNORMAL
EOSINOPHIL # BLD AUTO: 0 K/UL (ref 0–0.5)
EOSINOPHIL NFR BLD: 0.8 % (ref 0–8)
ERYTHROCYTE [DISTWIDTH] IN BLOOD BY AUTOMATED COUNT: 11.8 % (ref 11.5–14.5)
EST. GFR  (AFRICAN AMERICAN): >60 ML/MIN/1.73 M^2
EST. GFR  (NON AFRICAN AMERICAN): >60 ML/MIN/1.73 M^2
GLUCOSE SERPL-MCNC: 89 MG/DL (ref 70–110)
HCT VFR BLD AUTO: 38.1 % (ref 37–48.5)
HDLC SERPL-MCNC: 91 MG/DL (ref 40–75)
HDLC SERPL: 47.4 % (ref 20–50)
HGB BLD-MCNC: 13.1 G/DL (ref 12–16)
IMM GRANULOCYTES # BLD AUTO: 0.01 K/UL (ref 0–0.04)
IMM GRANULOCYTES NFR BLD AUTO: 0.3 % (ref 0–0.5)
LDLC SERPL CALC-MCNC: 93 MG/DL (ref 63–159)
LYMPHOCYTES # BLD AUTO: 1.3 K/UL (ref 1–4.8)
LYMPHOCYTES NFR BLD: 35.3 % (ref 18–48)
MCH RBC QN AUTO: 35.6 PG (ref 27–31)
MCHC RBC AUTO-ENTMCNC: 34.4 G/DL (ref 32–36)
MCV RBC AUTO: 104 FL (ref 82–98)
MONOCYTES # BLD AUTO: 0.4 K/UL (ref 0.3–1)
MONOCYTES NFR BLD: 9.6 % (ref 4–15)
NEUTROPHILS # BLD AUTO: 2 K/UL (ref 1.8–7.7)
NEUTROPHILS NFR BLD: 53.7 % (ref 38–73)
NONHDLC SERPL-MCNC: 101 MG/DL
NRBC BLD-RTO: 0 /100 WBC
PLATELET # BLD AUTO: 235 K/UL (ref 150–450)
PMV BLD AUTO: 10.1 FL (ref 9.2–12.9)
POTASSIUM SERPL-SCNC: 4.2 MMOL/L (ref 3.5–5.1)
PROT SERPL-MCNC: 7.6 G/DL (ref 6–8.4)
RBC # BLD AUTO: 3.68 M/UL (ref 4–5.4)
SODIUM SERPL-SCNC: 137 MMOL/L (ref 136–145)
TRIGL SERPL-MCNC: 40 MG/DL (ref 30–150)
TSH SERPL DL<=0.005 MIU/L-ACNC: 0.91 UIU/ML (ref 0.4–4)
WBC # BLD AUTO: 3.63 K/UL (ref 3.9–12.7)

## 2022-06-04 PROCEDURE — 80053 COMPREHEN METABOLIC PANEL: CPT | Performed by: NURSE PRACTITIONER

## 2022-06-04 PROCEDURE — 85025 COMPLETE CBC W/AUTO DIFF WBC: CPT | Performed by: NURSE PRACTITIONER

## 2022-06-04 PROCEDURE — 84443 ASSAY THYROID STIM HORMONE: CPT | Performed by: NURSE PRACTITIONER

## 2022-06-04 PROCEDURE — 36415 COLL VENOUS BLD VENIPUNCTURE: CPT | Performed by: NURSE PRACTITIONER

## 2022-06-04 PROCEDURE — 80061 LIPID PANEL: CPT | Performed by: NURSE PRACTITIONER

## 2022-06-04 PROCEDURE — 86340 INTRINSIC FACTOR ANTIBODY: CPT | Performed by: NURSE PRACTITIONER

## 2022-06-04 PROCEDURE — 82607 VITAMIN B-12: CPT | Performed by: NURSE PRACTITIONER

## 2022-06-04 PROCEDURE — 82306 VITAMIN D 25 HYDROXY: CPT | Performed by: NURSE PRACTITIONER

## 2022-06-07 LAB
ANNOTATION COMMENT IMP: NORMAL
IF BLOCK AB SER QL: NEGATIVE
VIT B12 SERPL-MCNC: 528 NG/L (ref 180–914)

## 2022-06-09 ENCOUNTER — PATIENT MESSAGE (OUTPATIENT)
Dept: INTERNAL MEDICINE | Facility: CLINIC | Age: 32
End: 2022-06-09
Payer: COMMERCIAL

## 2022-06-09 ENCOUNTER — OFFICE VISIT (OUTPATIENT)
Dept: INTERNAL MEDICINE | Facility: CLINIC | Age: 32
End: 2022-06-09
Payer: COMMERCIAL

## 2022-06-09 ENCOUNTER — PATIENT MESSAGE (OUTPATIENT)
Dept: INTERNAL MEDICINE | Facility: CLINIC | Age: 32
End: 2022-06-09

## 2022-06-09 ENCOUNTER — OFFICE VISIT (OUTPATIENT)
Dept: NEUROLOGY | Facility: CLINIC | Age: 32
End: 2022-06-09
Payer: COMMERCIAL

## 2022-06-09 VITALS
HEIGHT: 70 IN | WEIGHT: 135.81 LBS | OXYGEN SATURATION: 95 % | BODY MASS INDEX: 19.44 KG/M2 | DIASTOLIC BLOOD PRESSURE: 78 MMHG | HEART RATE: 73 BPM | SYSTOLIC BLOOD PRESSURE: 110 MMHG

## 2022-06-09 VITALS
DIASTOLIC BLOOD PRESSURE: 82 MMHG | HEIGHT: 71 IN | WEIGHT: 137.56 LBS | BODY MASS INDEX: 19.26 KG/M2 | HEART RATE: 78 BPM | SYSTOLIC BLOOD PRESSURE: 115 MMHG

## 2022-06-09 DIAGNOSIS — R51.9 NONINTRACTABLE HEADACHE, UNSPECIFIED CHRONICITY PATTERN, UNSPECIFIED HEADACHE TYPE: ICD-10-CM

## 2022-06-09 DIAGNOSIS — M62.81 MUSCLE WEAKNESS (GENERALIZED): ICD-10-CM

## 2022-06-09 DIAGNOSIS — N83.209 RUPTURED OVARIAN CYST: ICD-10-CM

## 2022-06-09 DIAGNOSIS — R00.2 HEART PALPITATIONS: ICD-10-CM

## 2022-06-09 DIAGNOSIS — G62.9 NEUROPATHY: ICD-10-CM

## 2022-06-09 DIAGNOSIS — R20.0 LEFT ARM NUMBNESS: ICD-10-CM

## 2022-06-09 DIAGNOSIS — Z00.00 ANNUAL PHYSICAL EXAM: Primary | ICD-10-CM

## 2022-06-09 DIAGNOSIS — R11.0 NAUSEA: ICD-10-CM

## 2022-06-09 DIAGNOSIS — R76.8 POSITIVE ANA (ANTINUCLEAR ANTIBODY): ICD-10-CM

## 2022-06-09 LAB
B-HCG UR QL: NEGATIVE
CTP QC/QA: YES

## 2022-06-09 PROCEDURE — 3074F PR MOST RECENT SYSTOLIC BLOOD PRESSURE < 130 MM HG: ICD-10-PCS | Mod: CPTII,S$GLB,, | Performed by: NEUROMUSCULOSKELETAL MEDICINE & OMM

## 2022-06-09 PROCEDURE — 3079F PR MOST RECENT DIASTOLIC BLOOD PRESSURE 80-89 MM HG: ICD-10-PCS | Mod: CPTII,S$GLB,, | Performed by: NEUROMUSCULOSKELETAL MEDICINE & OMM

## 2022-06-09 PROCEDURE — 99395 PREV VISIT EST AGE 18-39: CPT | Mod: S$GLB,,, | Performed by: NURSE PRACTITIONER

## 2022-06-09 PROCEDURE — 3078F DIAST BP <80 MM HG: CPT | Mod: CPTII,S$GLB,, | Performed by: NURSE PRACTITIONER

## 2022-06-09 PROCEDURE — 3008F BODY MASS INDEX DOCD: CPT | Mod: CPTII,S$GLB,, | Performed by: NEUROMUSCULOSKELETAL MEDICINE & OMM

## 2022-06-09 PROCEDURE — 3078F PR MOST RECENT DIASTOLIC BLOOD PRESSURE < 80 MM HG: ICD-10-PCS | Mod: CPTII,S$GLB,, | Performed by: NURSE PRACTITIONER

## 2022-06-09 PROCEDURE — 3008F BODY MASS INDEX DOCD: CPT | Mod: CPTII,S$GLB,, | Performed by: NURSE PRACTITIONER

## 2022-06-09 PROCEDURE — 3074F PR MOST RECENT SYSTOLIC BLOOD PRESSURE < 130 MM HG: ICD-10-PCS | Mod: CPTII,S$GLB,, | Performed by: NURSE PRACTITIONER

## 2022-06-09 PROCEDURE — 99213 PR OFFICE/OUTPT VISIT, EST, LEVL III, 20-29 MIN: ICD-10-PCS | Mod: S$GLB,,, | Performed by: NEUROMUSCULOSKELETAL MEDICINE & OMM

## 2022-06-09 PROCEDURE — 99999 PR PBB SHADOW E&M-EST. PATIENT-LVL IV: CPT | Mod: PBBFAC,,, | Performed by: NURSE PRACTITIONER

## 2022-06-09 PROCEDURE — 3074F SYST BP LT 130 MM HG: CPT | Mod: CPTII,S$GLB,, | Performed by: NEUROMUSCULOSKELETAL MEDICINE & OMM

## 2022-06-09 PROCEDURE — 81025 POCT URINE PREGNANCY: ICD-10-PCS | Mod: S$GLB,,, | Performed by: NURSE PRACTITIONER

## 2022-06-09 PROCEDURE — 3008F PR BODY MASS INDEX (BMI) DOCUMENTED: ICD-10-PCS | Mod: CPTII,S$GLB,, | Performed by: NURSE PRACTITIONER

## 2022-06-09 PROCEDURE — 99999 PR PBB SHADOW E&M-EST. PATIENT-LVL IV: ICD-10-PCS | Mod: PBBFAC,,, | Performed by: NURSE PRACTITIONER

## 2022-06-09 PROCEDURE — 99999 PR PBB SHADOW E&M-EST. PATIENT-LVL III: CPT | Mod: PBBFAC,,, | Performed by: NEUROMUSCULOSKELETAL MEDICINE & OMM

## 2022-06-09 PROCEDURE — 99395 PR PREVENTIVE VISIT,EST,18-39: ICD-10-PCS | Mod: S$GLB,,, | Performed by: NURSE PRACTITIONER

## 2022-06-09 PROCEDURE — 1159F MED LIST DOCD IN RCRD: CPT | Mod: CPTII,S$GLB,, | Performed by: NEUROMUSCULOSKELETAL MEDICINE & OMM

## 2022-06-09 PROCEDURE — 81025 URINE PREGNANCY TEST: CPT | Mod: S$GLB,,, | Performed by: NURSE PRACTITIONER

## 2022-06-09 PROCEDURE — 3079F DIAST BP 80-89 MM HG: CPT | Mod: CPTII,S$GLB,, | Performed by: NEUROMUSCULOSKELETAL MEDICINE & OMM

## 2022-06-09 PROCEDURE — 99213 OFFICE O/P EST LOW 20 MIN: CPT | Mod: S$GLB,,, | Performed by: NEUROMUSCULOSKELETAL MEDICINE & OMM

## 2022-06-09 PROCEDURE — 1159F PR MEDICATION LIST DOCUMENTED IN MEDICAL RECORD: ICD-10-PCS | Mod: CPTII,S$GLB,, | Performed by: NEUROMUSCULOSKELETAL MEDICINE & OMM

## 2022-06-09 PROCEDURE — 99999 PR PBB SHADOW E&M-EST. PATIENT-LVL III: ICD-10-PCS | Mod: PBBFAC,,, | Performed by: NEUROMUSCULOSKELETAL MEDICINE & OMM

## 2022-06-09 PROCEDURE — 3008F PR BODY MASS INDEX (BMI) DOCUMENTED: ICD-10-PCS | Mod: CPTII,S$GLB,, | Performed by: NEUROMUSCULOSKELETAL MEDICINE & OMM

## 2022-06-09 PROCEDURE — 3074F SYST BP LT 130 MM HG: CPT | Mod: CPTII,S$GLB,, | Performed by: NURSE PRACTITIONER

## 2022-06-09 NOTE — PROGRESS NOTES
"Internal Medicine Annual Exam       CHIEF COMPLAINT     The patient, Rachel Duncan, who is a 32 y.o. female paresthesias, ruptured ovarian cyst, and generalized muscle weakness presents for an annual exam.    HPI   Last seen 4/15/2021    Seen in the ED 6/1/2022 for numbness - states she felt numbness and tingling in all of her extremities. The right arm and leg paresthesias have resolved but the left arm paresthesias persist. Additional concern is of palpitations, chest pain, headaches, tinnitus. She has been evaluated for the arm numbness with an EMG and has undergone MRI brain 4/15/21 which "demonstrates no evidence of acute intracranial abnormality.  Specifically, no evidence of acute infarction."   5/22/2022- had traumatic experience at the airport - left lower abd - this was related to ovarian cyst - dx through symptomology and treated with naproxen    Also having heart palpitations   CXR no acute process.   EKG normal sinus rhythm rate of 65  Labs stable H&H 12/36, , stable electrolytes and renal function. Normal TSH.    Today she reports continued heart palpations and numbness and nausea everyday     Heart palpitations - describes as "noticeable heartbeat" and pounding. No skipped beasts. Occurs in the morning and at night     Nausea - every morning x 2 weeks. With two episodes of vomiting     Numbness - left greater than right   emg reviewed and normal.   xrays of the back showed mild scoliosis     Occasional ringing in ears and headaches  3-5 seconds - dx with covid 8/2021 and 12/2021    Past Medical History:  Past Medical History:   Diagnosis Date    Mononucleosis     Ovarian cyst        Past Surgical History:   Procedure Laterality Date    NO PAST SURGERIES          Family History   Problem Relation Age of Onset    No Known Problems Mother     Lymphoma Father     Cirrhosis Father     Brain cancer Maternal Grandfather         possible brain tumor         Social History     Socioeconomic " History    Marital status: Single    Number of children: 0   Occupational History    Occupation:    Tobacco Use    Smoking status: Current Some Day Smoker     Types: Vaping with nicotine     Start date: 2019    Smokeless tobacco: Never Used   Substance and Sexual Activity    Alcohol use: Yes     Comment: 4 drinks per week     Drug use: Never    Sexual activity: Yes     Partners: Male     Birth control/protection: Condom        Social History     Tobacco Use   Smoking Status Current Some Day Smoker    Types: Vaping with nicotine    Start date: 2019   Smokeless Tobacco Never Used        Allergies as of 06/09/2022    (No Known Allergies)          Home Medications:  Prior to Admission medications    Medication Sig Start Date End Date Taking? Authorizing Provider   naproxen (NAPROSYN) 500 MG tablet Take 1 tablet (500 mg total) by mouth 2 (two) times daily with meals.  Patient not taking: Reported on 6/9/2022 5/24/22 5/24/23  Torrie Espinoza MD       Review of Systems:  Review of Systems   Constitutional: Negative for chills, fatigue, fever and unexpected weight change.   HENT: Positive for tinnitus. Negative for congestion, hearing loss, rhinorrhea and sinus pressure.    Eyes: Negative for pain, redness and visual disturbance.   Respiratory: Negative for cough, shortness of breath and wheezing.    Cardiovascular: Positive for palpitations. Negative for chest pain.   Gastrointestinal: Positive for nausea. Negative for abdominal distention, abdominal pain, constipation, diarrhea and vomiting.   Endocrine: Negative for polydipsia, polyphagia and polyuria.   Genitourinary: Negative for dysuria, frequency, urgency and vaginal discharge.   Musculoskeletal: Positive for back pain. Negative for arthralgias, gait problem and myalgias.   Skin: Negative for color change and rash.   Allergic/Immunologic: Negative for environmental allergies and immunocompromised state.   Neurological: Positive for weakness,  "numbness and headaches. Negative for dizziness and light-headedness.   Hematological: Negative for adenopathy. Does not bruise/bleed easily.   Psychiatric/Behavioral: Negative for confusion and sleep disturbance. The patient is not nervous/anxious.        Health Maintainence:   Immunizations:  Health Maintenance       Date Due Completion Date    Pneumococcal Vaccines (Age 0-64) (1 - PCV) Never done ---    Influenza Vaccine (Season Ended) 09/01/2022 ---    Cervical Cancer Screening 04/26/2026 4/26/2021    TETANUS VACCINE 04/15/2031 4/15/2021           PHYSICAL EXAM     /78 (BP Location: Right arm, Patient Position: Sitting, BP Method: Medium (Manual))   Pulse 73   Ht 5' 10" (1.778 m)   Wt 61.6 kg (135 lb 12.9 oz)   LMP 05/22/2022   SpO2 95%   BMI 19.49 kg/m²  Body mass index is 19.49 kg/m².    Physical Exam  Vitals reviewed.   Constitutional:       Appearance: She is well-developed.   HENT:      Head: Normocephalic.      Right Ear: External ear normal.      Left Ear: External ear normal.      Nose: Nose normal.      Mouth/Throat:      Pharynx: No oropharyngeal exudate.   Eyes:      Pupils: Pupils are equal, round, and reactive to light.   Neck:      Thyroid: No thyromegaly.      Vascular: No JVD.      Trachea: No tracheal deviation.   Cardiovascular:      Rate and Rhythm: Normal rate and regular rhythm.      Heart sounds: Normal heart sounds. No murmur heard.    No friction rub. No gallop.   Pulmonary:      Effort: Pulmonary effort is normal. No respiratory distress.      Breath sounds: Normal breath sounds. No wheezing or rales.   Abdominal:      General: Bowel sounds are normal. There is no distension.      Palpations: Abdomen is soft.      Tenderness: There is no abdominal tenderness.   Musculoskeletal:         General: No tenderness. Normal range of motion.      Cervical back: Neck supple.   Lymphadenopathy:      Cervical: No cervical adenopathy.   Skin:     General: Skin is warm and dry.      " Findings: No rash.   Neurological:      Mental Status: She is alert and oriented to person, place, and time.   Psychiatric:         Behavior: Behavior normal.         LABS     No results found for: LABA1C, HGBA1C  CMP  Sodium   Date Value Ref Range Status   06/04/2022 137 136 - 145 mmol/L Final     Potassium   Date Value Ref Range Status   06/04/2022 4.2 3.5 - 5.1 mmol/L Final     Chloride   Date Value Ref Range Status   06/04/2022 100 95 - 110 mmol/L Final     CO2   Date Value Ref Range Status   06/04/2022 27 23 - 29 mmol/L Final     Glucose   Date Value Ref Range Status   06/04/2022 89 70 - 110 mg/dL Final     BUN   Date Value Ref Range Status   06/04/2022 8 6 - 20 mg/dL Final     Creatinine   Date Value Ref Range Status   06/04/2022 0.8 0.5 - 1.4 mg/dL Final     Calcium   Date Value Ref Range Status   06/04/2022 9.6 8.7 - 10.5 mg/dL Final     Total Protein   Date Value Ref Range Status   06/04/2022 7.6 6.0 - 8.4 g/dL Final     Albumin   Date Value Ref Range Status   06/04/2022 4.7 3.5 - 5.2 g/dL Final     Total Bilirubin   Date Value Ref Range Status   06/04/2022 0.6 0.1 - 1.0 mg/dL Final     Comment:     For infants and newborns, interpretation of results should be based  on gestational age, weight and in agreement with clinical  observations.    Premature Infant recommended reference ranges:  Up to 24 hours.............<8.0 mg/dL  Up to 48 hours............<12.0 mg/dL  3-5 days..................<15.0 mg/dL  6-29 days.................<15.0 mg/dL       Alkaline Phosphatase   Date Value Ref Range Status   06/04/2022 49 (L) 55 - 135 U/L Final     AST   Date Value Ref Range Status   06/04/2022 16 10 - 40 U/L Final     ALT   Date Value Ref Range Status   06/04/2022 9 (L) 10 - 44 U/L Final     Anion Gap   Date Value Ref Range Status   06/04/2022 10 8 - 16 mmol/L Final     eGFR if    Date Value Ref Range Status   06/04/2022 >60.0 >60 mL/min/1.73 m^2 Final     eGFR if non    Date Value  Ref Range Status   06/04/2022 >60.0 >60 mL/min/1.73 m^2 Final     Comment:     Calculation used to obtain the estimated glomerular filtration  rate (eGFR) is the CKD-EPI equation.        Lab Results   Component Value Date    WBC 3.63 (L) 06/04/2022    HGB 13.1 06/04/2022    HCT 38.1 06/04/2022     (H) 06/04/2022     06/04/2022     Lab Results   Component Value Date    CHOL 192 06/04/2022     Lab Results   Component Value Date    HDL 91 (H) 06/04/2022     Lab Results   Component Value Date    LDLCALC 93.0 06/04/2022     Lab Results   Component Value Date    TRIG 40 06/04/2022     Lab Results   Component Value Date    CHOLHDL 47.4 06/04/2022     Lab Results   Component Value Date    TSH 0.911 06/04/2022       ASSESSMENT/PLAN     Rachel Duncan is a 32 y.o. female    Annual physical exam- All age and gender related screenings discussed. Reviewed recent labs and ED records.     Muscle weakness (generalized)- reviewed EMG and MRI- will refer to neurology for opinion on headaches and paresthesias.   -     Ambulatory referral/consult to Neurology; Future; Expected date: 06/16/2022    Neuropathy- refer to neurology. Reviewed testing   -     Ambulatory referral/consult to Neurology; Future; Expected date: 06/16/2022    Nonintractable headache, unspecified chronicity pattern, unspecified headache type- new onset headaches- will refer to neurology   -     Ambulatory referral/consult to Neurology; Future; Expected date: 06/16/2022    Heart palpitations- send for cardiac event monitor.   -     Cardiac event monitor; Future    Nausea- poct upt negative   -     POCT Urine Pregnancy    Ruptured ovarian cyst- f/u with gyn.     Positive TO (antinuclear antibody)- reviewed Rheum notes. Will monitor     Follow up with PCP in 1 year for annual     Ernestina Roe-Acosta BELLO, APRN, FNP-c   Department of Internal Medicine - [facility]  8:05 AM

## 2022-06-09 NOTE — PROGRESS NOTES
This note was dictated with Modal Fluency a word recognition program. There are occasionally word recognition errors which are missed on review.  Rachel Duncan  1990  Review of patient's allergies indicates:  No Known Allergies  [unfilled]    Past Medical History:   Diagnosis Date    Mononucleosis     Ovarian cyst      Social History     Socioeconomic History    Marital status: Single    Number of children: 0   Occupational History    Occupation:    Tobacco Use    Smoking status: Current Some Day Smoker     Types: Vaping with nicotine     Start date: 2019    Smokeless tobacco: Never Used   Substance and Sexual Activity    Alcohol use: Yes     Comment: 4 drinks per week     Drug use: Never    Sexual activity: Yes     Partners: Male     Birth control/protection: Condom     Family History   Problem Relation Age of Onset    No Known Problems Mother     Lymphoma Father     Cirrhosis Father     Brain cancer Maternal Grandfather         possible brain tumor        Review of systems:  Constitutional-negative  Eyes-negative  ENT, mouth-negative  Cardiovascular-negative  Respiratory-negative  GI-negative  - negative  Musculoskeletal-negative  Skin-negative  Neurologic-negative  Psychiatric-negative  Endocrine-negative  Hematology/lymph nodes-negative  Allergies/immunology-negative  Rachel Duncan  1990  Review of patient's allergies indicates:  No Known Allergies  [unfilled]    Past Medical History:   Diagnosis Date    Mononucleosis     Ovarian cyst      Social History     Socioeconomic History    Marital status: Single    Number of children: 0   Occupational History    Occupation:    Tobacco Use    Smoking status: Current Some Day Smoker     Types: Vaping with nicotine     Start date: 2019    Smokeless tobacco: Never Used   Substance and Sexual Activity    Alcohol use: Yes     Comment: 4 drinks per week     Drug use: Never    Sexual activity: Yes     Partners:  Male     Birth control/protection: Condom     Family History   Problem Relation Age of Onset    No Known Problems Mother     Lymphoma Father     Cirrhosis Father     Brain cancer Maternal Grandfather         possible brain tumor        Review of systems:  Constitutional-negative  Eyes-negative  ENT, mouth-negative  Cardiovascular-negative  Respiratory-negative  GI-negative  - negative  Musculoskeletal-negative  Skin-negative  Neurologic-negative  Psychiatric-negative  Endocrine-negative  Hematology/lymph nodes-negative  Allergies/immunology-negative  Gen. Appearance: Well-developed with no obvious deformities  Carotid arteries symmetrical pulses  Peripheral vascular shows symmetrical pulses with no obvious edema or tenderness  Social History :  Patient is a malpractice   Present history:   This is a 32-year-old white female who presents for follow-up for EMG results.  EMG results showed normal study.  She continues to complain of left arm numbness with pins and needles.  She occasionally has some neck pain.  The numbness in the arm is intermittent.  She also complains of nonspecific overall headaches particularly with driving or sitting at the computer for long periods of time.  She has had an MRI of the brain in the past which was normal.  Recently she has been taking turmeric once a day to help with the anti-inflammatories and seems to relieve a lot of the pain.  She has not had an MRI of the cervical spine and I think at this stage of the game we should probably    Neurological Exam:  Mental status-alert and oriented to person, place, and time; attention span and concentration is good. Fund of knowledge-patient is aware of current events and able to give detailed history of the current problem.recent and remote memory seems intact. Language function is normal with no evidence of aphasia  Cranial nerves:Visual acuity to hand chart -normal; visual fields to confrontation normal;pupils were  equal and reactive to light ;no evidence of ptosis ;  funduscopic examination was normal with sharp disc margins. external ocular movements were full with no nystagmus. Facial sensation to pinprick : normal ; corneal reflexes intact; Facial muscles were symmetrical. Hearing is unimpaired symmetrical finger rub; Tongue movements - normal ; palate movements - normal ;Swallowing unimpaired. Shoulder shrug was intact with good strength Speech was normal  Motor examination: Upper : normal                                      Lower extremities - Normal;muscle tone was normal ;                  Right-handed  Sensory examination:   Upper; normal pinprick and soft touch ;   Lower extremities - normal and symmetrical.   Vibration sense: 15-20 seconds @ toes  Deep tendon reflexes: upper extremities :1-2+ symmetrical ;     lower extremities KJ- 1-2 +; AJ - 1-2+ Both plantar responses were flexor  Cerebellar examination upper: Normal finger to nose and rapid alternating movements  Gait: Steady with no ataxia;      heel and toe walk normal  Romberg test: negative       Tandem gait: Normal  order that.  Involuntary movements: Negative  TMJ - no tenderness  Cervical examination: Full range of motion with no pain Cervical tenderness :negative  Lumbar examination: Low back tenderness-negative                  Sciatic notchtenderness-negative            Straight leg raising : negative    Impression:  Although on neurologic exam of the left arm is normal with no motor or sensory deficits I think at this point we will order MRI of the cervical spine which may be    Recommendations/Plan :  MRI cervical spine.  Follow-up in 2 months

## 2022-06-21 ENCOUNTER — PATIENT MESSAGE (OUTPATIENT)
Dept: NEUROLOGY | Facility: CLINIC | Age: 32
End: 2022-06-21
Payer: COMMERCIAL

## 2022-06-30 ENCOUNTER — OFFICE VISIT (OUTPATIENT)
Dept: OBSTETRICS AND GYNECOLOGY | Facility: CLINIC | Age: 32
End: 2022-06-30
Payer: COMMERCIAL

## 2022-06-30 VITALS
WEIGHT: 135.13 LBS | DIASTOLIC BLOOD PRESSURE: 79 MMHG | SYSTOLIC BLOOD PRESSURE: 120 MMHG | BODY MASS INDEX: 18.92 KG/M2 | HEIGHT: 71 IN

## 2022-06-30 DIAGNOSIS — Z01.419 ENCOUNTER FOR WELL WOMAN EXAM WITH ROUTINE GYNECOLOGICAL EXAM: Primary | ICD-10-CM

## 2022-06-30 DIAGNOSIS — N83.209 RUPTURED OVARIAN CYST: ICD-10-CM

## 2022-06-30 PROCEDURE — 1159F MED LIST DOCD IN RCRD: CPT | Mod: CPTII,S$GLB,, | Performed by: OBSTETRICS & GYNECOLOGY

## 2022-06-30 PROCEDURE — 3078F PR MOST RECENT DIASTOLIC BLOOD PRESSURE < 80 MM HG: ICD-10-PCS | Mod: CPTII,S$GLB,, | Performed by: OBSTETRICS & GYNECOLOGY

## 2022-06-30 PROCEDURE — 3008F PR BODY MASS INDEX (BMI) DOCUMENTED: ICD-10-PCS | Mod: CPTII,S$GLB,, | Performed by: OBSTETRICS & GYNECOLOGY

## 2022-06-30 PROCEDURE — 99395 PREV VISIT EST AGE 18-39: CPT | Mod: S$GLB,,, | Performed by: OBSTETRICS & GYNECOLOGY

## 2022-06-30 PROCEDURE — 99999 PR PBB SHADOW E&M-EST. PATIENT-LVL III: ICD-10-PCS | Mod: PBBFAC,,, | Performed by: OBSTETRICS & GYNECOLOGY

## 2022-06-30 PROCEDURE — 99395 PR PREVENTIVE VISIT,EST,18-39: ICD-10-PCS | Mod: S$GLB,,, | Performed by: OBSTETRICS & GYNECOLOGY

## 2022-06-30 PROCEDURE — 1160F RVW MEDS BY RX/DR IN RCRD: CPT | Mod: CPTII,S$GLB,, | Performed by: OBSTETRICS & GYNECOLOGY

## 2022-06-30 PROCEDURE — 3078F DIAST BP <80 MM HG: CPT | Mod: CPTII,S$GLB,, | Performed by: OBSTETRICS & GYNECOLOGY

## 2022-06-30 PROCEDURE — 1159F PR MEDICATION LIST DOCUMENTED IN MEDICAL RECORD: ICD-10-PCS | Mod: CPTII,S$GLB,, | Performed by: OBSTETRICS & GYNECOLOGY

## 2022-06-30 PROCEDURE — 1160F PR REVIEW ALL MEDS BY PRESCRIBER/CLIN PHARMACIST DOCUMENTED: ICD-10-PCS | Mod: CPTII,S$GLB,, | Performed by: OBSTETRICS & GYNECOLOGY

## 2022-06-30 PROCEDURE — 3074F PR MOST RECENT SYSTOLIC BLOOD PRESSURE < 130 MM HG: ICD-10-PCS | Mod: CPTII,S$GLB,, | Performed by: OBSTETRICS & GYNECOLOGY

## 2022-06-30 PROCEDURE — 99999 PR PBB SHADOW E&M-EST. PATIENT-LVL III: CPT | Mod: PBBFAC,,, | Performed by: OBSTETRICS & GYNECOLOGY

## 2022-06-30 PROCEDURE — 3008F BODY MASS INDEX DOCD: CPT | Mod: CPTII,S$GLB,, | Performed by: OBSTETRICS & GYNECOLOGY

## 2022-06-30 PROCEDURE — 3074F SYST BP LT 130 MM HG: CPT | Mod: CPTII,S$GLB,, | Performed by: OBSTETRICS & GYNECOLOGY

## 2022-06-30 NOTE — ASSESSMENT & PLAN NOTE
Normal breast and pelvic exams except as noted. Pap/cotesting up to date, STD screening declined. Continue breast self awareness, recommend 30 minutes of exercise 5 times weekly. RTC 1 year for annual exam, sooner PRN.     TVUS to r/o existing ovarian cyst. Will schedule today and follow up with concerning findings.

## 2022-06-30 NOTE — PROGRESS NOTES
Chief Complaint: Annual exam    Chief Complaint   Patient presents with    Well Woman       HPI:   32 y.o.  here today for annual exam. Doing well with no complaints. Denies any changes to health history since last visit. Patient reports her cycles have become more regular in recent years, lasting 2-5 days using 5-6 PPD on the first two days. She denies passage of clots or cramping. Denies any irregular menstrual bleeding or post-coital bleeding. Patient denies abnormal breast symptoms, denies FH of breast, uterine, ovarian, or colon cancer. Patient denies vaginal discharge, itching, irritation, odor, abdominal pain, urinary complaints, or change in bowel or bladder habits. She is currently sexually active. Currently using condoms for birth control. She declines STD testing today. She has been vaccinated against COVID-19. Received Gardasil vaccine.     Cyst pain resolved. Started period on Monday. Has been told she had PCOS since age 18-19. Getting  . Medical malpractice defense .     LMP Dates from Last 1 Encounters:   LMP: 2022     Pap (2021): NILM/HPV neg    Labs / Significant Studies    Office Visit on 2022   Component Date Value Ref Range Status    POC Preg Test, Ur 2022 Negative  Negative Final     Acceptable 2022 Yes   Final   Lab Visit on 2022   Component Date Value Ref Range Status    Intrinsic Factor 2022 Negative  Negative Final    AIF Comment 2022 SEE BELOW   Final    Comment: Intrinsic Factor Blocking Antibody (IFBA) antibodies are   absent in approximately 50% of individuals with pernicious   anemia (PA). The absence of elevated IFBA antibodies does   not rule out the presence of PA; further studies such as   gastrin testing may be indicated.    Test Performed by:  [facility]  [address] [place] [address]  : Carson Card M.D. Ph.D.; CLIA#  [id number]      WBC 06/04/2022 3.63 (A) 3.90 - 12.70 K/uL Final    RBC 06/04/2022 3.68 (A) 4.00 - 5.40 M/uL Final    Hemoglobin 06/04/2022 13.1  12.0 - 16.0 g/dL Final    Hematocrit 06/04/2022 38.1  37.0 - 48.5 % Final    MCV 06/04/2022 104 (A) 82 - 98 fL Final    MCH 06/04/2022 35.6 (A) 27.0 - 31.0 pg Final    MCHC 06/04/2022 34.4  32.0 - 36.0 g/dL Final    RDW 06/04/2022 11.8  11.5 - 14.5 % Final    Platelets 06/04/2022 235  150 - 450 K/uL Final    MPV 06/04/2022 10.1  9.2 - 12.9 fL Final    Immature Granulocytes 06/04/2022 0.3  0.0 - 0.5 % Final    Gran # (ANC) 06/04/2022 2.0  1.8 - 7.7 K/uL Final    Immature Grans (Abs) 06/04/2022 0.01  0.00 - 0.04 K/uL Final    Comment: Mild elevation in immature granulocytes is non specific and   can be seen in a variety of conditions including stress response,   acute inflammation, trauma and pregnancy. Correlation with other   laboratory and clinical findings is essential.      Lymph # 06/04/2022 1.3  1.0 - 4.8 K/uL Final    Mono # 06/04/2022 0.4  0.3 - 1.0 K/uL Final    Eos # 06/04/2022 0.0  0.0 - 0.5 K/uL Final    Baso # 06/04/2022 0.01  0.00 - 0.20 K/uL Final    nRBC 06/04/2022 0  0 /100 WBC Final    Gran % 06/04/2022 53.7  38.0 - 73.0 % Final    Lymph % 06/04/2022 35.3  18.0 - 48.0 % Final    Mono % 06/04/2022 9.6  4.0 - 15.0 % Final    Eosinophil % 06/04/2022 0.8  0.0 - 8.0 % Final    Basophil % 06/04/2022 0.3  0.0 - 1.9 % Final    Differential Method 06/04/2022 Automated   Final    Sodium 06/04/2022 137  136 - 145 mmol/L Final    Potassium 06/04/2022 4.2  3.5 - 5.1 mmol/L Final    Chloride 06/04/2022 100  95 - 110 mmol/L Final    CO2 06/04/2022 27  23 - 29 mmol/L Final    Glucose 06/04/2022 89  70 - 110 mg/dL Final    BUN 06/04/2022 8  6 - 20 mg/dL Final    Creatinine 06/04/2022 0.8  0.5 - 1.4 mg/dL Final    Calcium 06/04/2022 9.6  8.7 - 10.5 mg/dL Final    Total Protein 06/04/2022 7.6  6.0 - 8.4 g/dL Final    Albumin 06/04/2022 4.7   3.5 - 5.2 g/dL Final    Total Bilirubin 06/04/2022 0.6  0.1 - 1.0 mg/dL Final    Comment: For infants and newborns, interpretation of results should be based  on gestational age, weight and in agreement with clinical  observations.    Premature Infant recommended reference ranges:  Up to 24 hours.............<8.0 mg/dL  Up to 48 hours............<12.0 mg/dL  3-5 days..................<15.0 mg/dL  6-29 days.................<15.0 mg/dL      Alkaline Phosphatase 06/04/2022 49 (A) 55 - 135 U/L Final    AST 06/04/2022 16  10 - 40 U/L Final    ALT 06/04/2022 9 (A) 10 - 44 U/L Final    Anion Gap 06/04/2022 10  8 - 16 mmol/L Final    eGFR if African American 06/04/2022 >60.0  >60 mL/min/1.73 m^2 Final    eGFR if non African American 06/04/2022 >60.0  >60 mL/min/1.73 m^2 Final    Comment: Calculation used to obtain the estimated glomerular filtration  rate (eGFR) is the CKD-EPI equation.       Cholesterol 06/04/2022 192  120 - 199 mg/dL Final    Comment: The National Cholesterol Education Program (NCEP) has set the  following guidelines (reference ranges) for Cholesterol:  Optimal.....................<200 mg/dL  Borderline High.............200-239 mg/dL  High........................> or = 240 mg/dL      Triglycerides 06/04/2022 40  30 - 150 mg/dL Final    Comment: The National Cholesterol Education Program (NCEP) has set the  following guidelines (reference values) for triglycerides:  Normal......................<150 mg/dL  Borderline High.............150-199 mg/dL  High........................200-499 mg/dL      HDL 06/04/2022 91 (A) 40 - 75 mg/dL Final    Comment: The National Cholesterol Education Program (NCEP) has set the  following guidelines (reference values) for HDL Cholesterol:  Low...............<40 mg/dL  Optimal...........>60 mg/dL      LDL Cholesterol 06/04/2022 93.0  63.0 - 159.0 mg/dL Final    Comment: The National Cholesterol Education Program (NCEP) has set the  following guidelines (reference  values) for LDL Cholesterol:  Optimal.......................<130 mg/dL  Borderline High...............130-159 mg/dL  High..........................160-189 mg/dL  Very High.....................>190 mg/dL      HDL/Cholesterol Ratio 06/04/2022 47.4  20.0 - 50.0 % Final    Total Cholesterol/HDL Ratio 06/04/2022 2.1  2.0 - 5.0 Final    Non-HDL Cholesterol 06/04/2022 101  mg/dL Final    Comment: Risk category and Non-HDL cholesterol goals:  Coronary heart disease (CHD)or equivalent (10-year risk of CHD >20%):  Non-HDL cholesterol goal     <130 mg/dL  Two or more CHD risk factors and 10-year risk of CHD <= 20%:  Non-HDL cholesterol goal     <160 mg/dL  0 to 1 CHD risk factor:  Non-HDL cholesterol goal     <190 mg/dL      Vit D, 25-Hydroxy 06/04/2022 79  30 - 96 ng/mL Final    Comment: Vitamin D deficiency.........<10 ng/mL                              Vitamin D insufficiency......10-29 ng/mL       Vitamin D sufficiency........> or equal to 30 ng/mL  Vitamin D toxicity............>100 ng/mL      Vitamin B-12 06/04/2022 528  180 - 914 ng/L Final    Comment: Test Performed by:  [facility]  [address] [place] [address]  : Carson Card M.D. Ph.D.; CLIA# [id number]      TSH 06/04/2022 0.911  0.400 - 4.000 uIU/mL Final   Admission on 06/01/2022, Discharged on 06/01/2022   Component Date Value Ref Range Status    HIV 1/2 Ag/Ab 06/01/2022 Negative  Negative Final    Hepatitis C Ab 06/01/2022 Negative  Negative Final    Sodium 06/01/2022 140  136 - 145 mmol/L Final    Potassium 06/01/2022 4.4  3.5 - 5.1 mmol/L Final    Chloride 06/01/2022 105  95 - 110 mmol/L Final    CO2 06/01/2022 26  23 - 29 mmol/L Final    Glucose 06/01/2022 102  70 - 110 mg/dL Final    BUN 06/01/2022 7  6 - 20 mg/dL Final    Creatinine 06/01/2022 0.7  0.5 - 1.4 mg/dL Final    Calcium 06/01/2022 9.3  8.7 - 10.5 mg/dL Final    Total Protein 06/01/2022 7.1  6.0 - 8.4 g/dL  Final    Albumin 06/01/2022 4.5  3.5 - 5.2 g/dL Final    Total Bilirubin 06/01/2022 1.0  0.1 - 1.0 mg/dL Final    Comment: For infants and newborns, interpretation of results should be based  on gestational age, weight and in agreement with clinical  observations.    Premature Infant recommended reference ranges:  Up to 24 hours.............<8.0 mg/dL  Up to 48 hours............<12.0 mg/dL  3-5 days..................<15.0 mg/dL  6-29 days.................<15.0 mg/dL      Alkaline Phosphatase 06/01/2022 50 (A) 55 - 135 U/L Final    AST 06/01/2022 15  10 - 40 U/L Final    ALT 06/01/2022 9 (A) 10 - 44 U/L Final    Anion Gap 06/01/2022 9  8 - 16 mmol/L Final    eGFR if African American 06/01/2022 >60.0  >60 mL/min/1.73 m^2 Final    eGFR if non African American 06/01/2022 >60.0  >60 mL/min/1.73 m^2 Final    Comment: Calculation used to obtain the estimated glomerular filtration  rate (eGFR) is the CKD-EPI equation.       WBC 06/01/2022 3.55 (A) 3.90 - 12.70 K/uL Final    RBC 06/01/2022 3.51 (A) 4.00 - 5.40 M/uL Final    Hemoglobin 06/01/2022 12.2  12.0 - 16.0 g/dL Final    Hematocrit 06/01/2022 36.0 (A) 37.0 - 48.5 % Final    MCV 06/01/2022 103 (A) 82 - 98 fL Final    MCH 06/01/2022 34.8 (A) 27.0 - 31.0 pg Final    MCHC 06/01/2022 33.9  32.0 - 36.0 g/dL Final    RDW 06/01/2022 11.6  11.5 - 14.5 % Final    Platelets 06/01/2022 197  150 - 450 K/uL Final    MPV 06/01/2022 9.4  9.2 - 12.9 fL Final    Immature Granulocytes 06/01/2022 0.3  0.0 - 0.5 % Final    Gran # (ANC) 06/01/2022 1.8  1.8 - 7.7 K/uL Final    Immature Grans (Abs) 06/01/2022 0.01  0.00 - 0.04 K/uL Final    Comment: Mild elevation in immature granulocytes is non specific and   can be seen in a variety of conditions including stress response,   acute inflammation, trauma and pregnancy. Correlation with other   laboratory and clinical findings is essential.      Lymph # 06/01/2022 1.3  1.0 - 4.8 K/uL Final    Mono # 06/01/2022 0.4  0.3 -  1.0 K/uL Final    Eos # 06/01/2022 0.0  0.0 - 0.5 K/uL Final    Baso # 06/01/2022 0.02  0.00 - 0.20 K/uL Final    nRBC 06/01/2022 0  0 /100 WBC Final    Gran % 06/01/2022 50.9  38.0 - 73.0 % Final    Lymph % 06/01/2022 37.2  18.0 - 48.0 % Final    Mono % 06/01/2022 10.4  4.0 - 15.0 % Final    Eosinophil % 06/01/2022 0.6  0.0 - 8.0 % Final    Basophil % 06/01/2022 0.6  0.0 - 1.9 % Final    Differential Method 06/01/2022 Automated   Final    TSH 06/01/2022 1.146  0.400 - 4.000 uIU/mL Final    POC Preg Test, Ur 06/01/2022 Negative  Negative Final     Acceptable 06/01/2022 Yes   Final    POC Rapid COVID 06/01/2022 Negative  Negative Final     Acceptable 06/01/2022 Yes   Final   Lab Visit on 05/24/2022   Component Date Value Ref Range Status    WBC 05/24/2022 2.99 (A) 3.90 - 12.70 K/uL Final    RBC 05/24/2022 3.26 (A) 4.00 - 5.40 M/uL Final    Hemoglobin 05/24/2022 11.6 (A) 12.0 - 16.0 g/dL Final    Hematocrit 05/24/2022 34.5 (A) 37.0 - 48.5 % Final    MCV 05/24/2022 106 (A) 82 - 98 fL Final    MCH 05/24/2022 35.6 (A) 27.0 - 31.0 pg Final    MCHC 05/24/2022 33.6  32.0 - 36.0 g/dL Final    RDW 05/24/2022 11.9  11.5 - 14.5 % Final    Platelets 05/24/2022 198  150 - 450 K/uL Final    MPV 05/24/2022 10.5  9.2 - 12.9 fL Final    Immature Granulocytes 05/24/2022 0.3  0.0 - 0.5 % Final    Gran # (ANC) 05/24/2022 1.6 (A) 1.8 - 7.7 K/uL Final    Immature Grans (Abs) 05/24/2022 0.01  0.00 - 0.04 K/uL Final    Comment: Mild elevation in immature granulocytes is non specific and   can be seen in a variety of conditions including stress response,   acute inflammation, trauma and pregnancy. Correlation with other   laboratory and clinical findings is essential.      Lymph # 05/24/2022 1.1  1.0 - 4.8 K/uL Final    Mono # 05/24/2022 0.3  0.3 - 1.0 K/uL Final    Eos # 05/24/2022 0.0  0.0 - 0.5 K/uL Final    Baso # 05/24/2022 0.01  0.00 - 0.20 K/uL Final    nRBC 05/24/2022 0  0  /100 WBC Final    Gran % 2022 53.6  38.0 - 73.0 % Final    Lymph % 2022 35.1  18.0 - 48.0 % Final    Mono % 2022 10.4  4.0 - 15.0 % Final    Eosinophil % 2022 0.3  0.0 - 8.0 % Final    Basophil % 2022 0.3  0.0 - 1.9 % Final    Differential Method 2022 Automated   Final   Office Visit on 2022   Component Date Value Ref Range Status    POC Preg Test, Ur 2022 Negative  Negative Final     Acceptable 2022 Yes   Final          Past Medical History:   Diagnosis Date    Mononucleosis     Ovarian cyst      Past Surgical History:   Procedure Laterality Date    NO PAST SURGERIES       No current outpatient medications on file.  Review of patient's allergies indicates:  No Known Allergies  OB History    Para Term  AB Living   0 0 0 0 0 0   SAB IAB Ectopic Multiple Live Births   0 0 0 0 0     Social History     Tobacco Use    Smoking status: Current Some Day Smoker     Types: Vaping with nicotine     Start date:     Smokeless tobacco: Never Used   Substance Use Topics    Alcohol use: Yes     Comment: 4 drinks per week     Drug use: Never     Family History   Problem Relation Age of Onset    No Known Problems Mother     Lymphoma Father     Cirrhosis Father     Brain cancer Maternal Grandfather         possible brain tumor     Breast cancer Neg Hx     Colon cancer Neg Hx     Ovarian cancer Neg Hx        Review of Systems   Negative except as in HPI     Physical Exam   Vitals:    22 0919   BP: 120/79     Body mass index is 18.85 kg/m².    Physical Exam  Constitutional:       General: She is not in acute distress.     Appearance: Normal appearance.   Genitourinary:      Vulva, bladder and urethral meatus normal.      No vaginal discharge or bleeding.        Right Adnexa: not tender, not full and no mass present.     Left Adnexa: not tender, not full and no mass present.     No cervical motion tenderness or lesion.       Uterus is not tender.      No uterine mass detected.     Uterus is anteverted.      Bladder is not tender.       Pelvic exam was performed with patient in the lithotomy position.   Breasts:      Right: No mass, nipple discharge, tenderness, axillary adenopathy or supraclavicular adenopathy.      Left: No mass, nipple discharge, tenderness, axillary adenopathy or supraclavicular adenopathy.       HENT:      Head: Normocephalic and atraumatic.   Pulmonary:      Effort: Pulmonary effort is normal.   Abdominal:      General: Bowel sounds are normal. There is no distension.      Palpations: Abdomen is soft. There is no mass.      Tenderness: There is no abdominal tenderness. There is no guarding.   Musculoskeletal:         General: Normal range of motion.      Cervical back: Normal range of motion.   Lymphadenopathy:      Upper Body:      Right upper body: No supraclavicular, axillary or pectoral adenopathy.      Left upper body: No supraclavicular, axillary or pectoral adenopathy.   Neurological:      General: No focal deficit present.      Mental Status: She is alert and oriented to person, place, and time.   Skin:     General: Skin is warm and dry.   Psychiatric:         Mood and Affect: Mood normal.         Behavior: Behavior normal.         Thought Content: Thought content normal.         Judgment: Judgment normal.   Exam conducted with a chaperone present.          ASSESSMENT:   Annual Well Women Exam  Patient Active Problem List   Diagnosis    Positive TO (antinuclear antibody)    Upper back pain    Muscle weakness (generalized)    Ruptured ovarian cyst    Left arm numbness    Encounter for well woman exam with routine gynecological exam     Health Maintenance Due   Topic Date Due    Pneumococcal Vaccines (Age 0-64) (1 - PCV) Never done     Health Maintenance Topics with due status: Not Due       Topic Last Completion Date    TETANUS VACCINE 04/15/2021    Cervical Cancer Screening 04/26/2021    Influenza  Vaccine Not Due       PLAN:  Problem List Items Addressed This Visit        Renal/    Ruptured ovarian cyst    Encounter for well woman exam with routine gynecological exam - Primary    Current Assessment & Plan     Normal breast and pelvic exams except as noted. Pap/cotesting up to date, STD screening declined. Continue breast self awareness, recommend 30 minutes of exercise 5 times weekly. RTC 1 year for annual exam, sooner PRN.     TVUS to r/o existing ovarian cyst. Will schedule today and follow up with concerning findings.                    Follow up if symptoms worsen or fail to improve.       Torrie Espinoza MD  Department of Obstetrics & Gynecology  [facility]

## 2022-07-22 ENCOUNTER — PATIENT MESSAGE (OUTPATIENT)
Dept: NEUROLOGY | Facility: CLINIC | Age: 32
End: 2022-07-22
Payer: COMMERCIAL

## 2022-07-25 ENCOUNTER — PATIENT MESSAGE (OUTPATIENT)
Dept: NEUROLOGY | Facility: CLINIC | Age: 32
End: 2022-07-25
Payer: COMMERCIAL

## 2022-07-25 DIAGNOSIS — R51.9 NONINTRACTABLE HEADACHE, UNSPECIFIED CHRONICITY PATTERN, UNSPECIFIED HEADACHE TYPE: ICD-10-CM

## 2022-07-25 DIAGNOSIS — R20.0 LEFT ARM NUMBNESS: ICD-10-CM

## 2022-07-25 DIAGNOSIS — G62.9 NEUROPATHY: ICD-10-CM

## 2022-07-25 DIAGNOSIS — M62.81 MUSCLE WEAKNESS (GENERALIZED): Primary | ICD-10-CM

## 2022-08-01 ENCOUNTER — PATIENT MESSAGE (OUTPATIENT)
Dept: NEUROLOGY | Facility: CLINIC | Age: 32
End: 2022-08-01
Payer: COMMERCIAL

## 2022-08-09 ENCOUNTER — PATIENT MESSAGE (OUTPATIENT)
Dept: INTERNAL MEDICINE | Facility: CLINIC | Age: 32
End: 2022-08-09
Payer: COMMERCIAL

## 2022-08-10 ENCOUNTER — PATIENT MESSAGE (OUTPATIENT)
Dept: INTERNAL MEDICINE | Facility: CLINIC | Age: 32
End: 2022-08-10
Payer: COMMERCIAL

## 2022-08-11 ENCOUNTER — PATIENT MESSAGE (OUTPATIENT)
Dept: NEUROLOGY | Facility: CLINIC | Age: 32
End: 2022-08-11
Payer: COMMERCIAL

## 2022-08-16 ENCOUNTER — PATIENT MESSAGE (OUTPATIENT)
Dept: NEUROLOGY | Facility: CLINIC | Age: 32
End: 2022-08-16
Payer: COMMERCIAL

## 2022-08-16 ENCOUNTER — PATIENT MESSAGE (OUTPATIENT)
Dept: INTERNAL MEDICINE | Facility: CLINIC | Age: 32
End: 2022-08-16
Payer: COMMERCIAL

## 2022-08-16 ENCOUNTER — TELEPHONE (OUTPATIENT)
Dept: INTERNAL MEDICINE | Facility: CLINIC | Age: 32
End: 2022-08-16
Payer: COMMERCIAL

## 2022-08-17 ENCOUNTER — PATIENT MESSAGE (OUTPATIENT)
Dept: NEUROLOGY | Facility: CLINIC | Age: 32
End: 2022-08-17
Payer: COMMERCIAL

## 2022-08-19 ENCOUNTER — PATIENT MESSAGE (OUTPATIENT)
Dept: NEUROLOGY | Facility: CLINIC | Age: 32
End: 2022-08-19
Payer: COMMERCIAL

## 2022-10-12 ENCOUNTER — OFFICE VISIT (OUTPATIENT)
Dept: NEUROLOGY | Facility: CLINIC | Age: 32
End: 2022-10-12
Payer: COMMERCIAL

## 2022-10-12 VITALS
SYSTOLIC BLOOD PRESSURE: 101 MMHG | WEIGHT: 136.69 LBS | HEIGHT: 71 IN | DIASTOLIC BLOOD PRESSURE: 70 MMHG | HEART RATE: 71 BPM | BODY MASS INDEX: 19.14 KG/M2

## 2022-10-12 DIAGNOSIS — R20.2 PARESTHESIA OF LEFT ARM: Primary | ICD-10-CM

## 2022-10-12 PROCEDURE — 99999 PR PBB SHADOW E&M-EST. PATIENT-LVL III: CPT | Mod: PBBFAC,,, | Performed by: STUDENT IN AN ORGANIZED HEALTH CARE EDUCATION/TRAINING PROGRAM

## 2022-10-12 PROCEDURE — 99999 PR PBB SHADOW E&M-EST. PATIENT-LVL III: ICD-10-PCS | Mod: PBBFAC,,, | Performed by: STUDENT IN AN ORGANIZED HEALTH CARE EDUCATION/TRAINING PROGRAM

## 2022-10-12 NOTE — PATIENT INSTRUCTIONS
You have an MRI of your head and neck ordered. You will receive a call to schedule this.     Regarding multiple sclerosis, look for signs of vision loss, weakness, or incontinence. Another thing to look out for is if you get whole body numbness/tingling again. If any of these develop, please go the the ER for evaluation and send us a message.

## 2022-10-12 NOTE — PROGRESS NOTES
[facility] - NEUROLOGY 7TH [address] [facility], [place]    Date: 10/12/22  Patient Name: Rachel Duncan   MRN: [id number]   PCP: Ernestina Pop  Referring Provider: No ref. provider found    Assessment:   Rachel Duncan is a 32 y.o. female presenting for 1.5 years of waxing/waning left arm paresthesias without weakness. No sensory deficit appreciated on exam. So far has had negative MRI C spine with contrast and normal EMG. Concern for MS vs peripheral process such as radiculopathy.     Plan:     Problem List Items Addressed This Visit          Other    Paresthesia of left arm - Primary    Relevant Orders    MRI Brain Demyelinating W W/O Contrast    MRI Cervical Spine Demyelinating W W/O Contrast       - MRI Brain and C spine demyelinating protocol w/wo contrast  - discussed red flag symptoms of vision loss/change, weakness, incontinence, spreading paresthesias    Follow up in 3 months, will review MRI results when available    Ivet Elizabeth MD      Subjective:   Patient seen in consultation for the evaluation of left paresthesias.        HPI:   Ms. Rachel Duncan is a 32 y.o. female with no significant PMH, right hand dominant presenting for evaluation of left arm numbness/tingling that started over a year ago. Initially intermittent, now daily and lasts most of the day. Mostly in upper arm, goes to pinky and ring fingers and less frequently to the other fingers. Also occurs sometimes in left face since a few months ago, previously has happened in both legs and right arm. Feels that staying calm has helped so that symptoms are only in left arm. No provoking factors noted, improved by drinking wine--has one glass a night. Eats a regular diet without restrictions. Takes turmeric/curcumin that helped her upper back pain, as well as vitamin D and B12 supplement.    Hit top of head a year ago and has had headaches since then. Did not seek medical attention at that time.  "Headache lasts 3-5 seconds, location varies, sometimes with tinnitus. Was happening daily over the summer, less frequently recently. Occurs while sitting. Don't limit activity.    Has worked as a medical malpractice defense  for 3 years, does not feel she has had increased stress lately.    Saw Dr. Rice 6/9/22 when she had no sensory or motor deficit in the left arm.     4/15/21 IM note: Pt with daily bilateral upper and lower extremity weakness and numbness for the last six months. She also reports feeling lightheaded with headaches and tinnitus to her left ear daily.  Headaches lead to numbness of her head and neck.    PAST MEDICAL HISTORY:  Past Medical History:   Diagnosis Date    Mononucleosis     Ovarian cyst        PAST SURGICAL HISTORY:  Past Surgical History:   Procedure Laterality Date    NO PAST SURGERIES         CURRENT MEDS:  No current outpatient medications on file.     No current facility-administered medications for this visit.       ALLERGIES:  Review of patient's allergies indicates:  No Known Allergies    FAMILY HISTORY:  Family History   Problem Relation Age of Onset    No Known Problems Mother     Lymphoma Father     Cirrhosis Father     Brain cancer Maternal Grandfather         possible brain tumor     Breast cancer Neg Hx     Colon cancer Neg Hx     Ovarian cancer Neg Hx        SOCIAL HISTORY:  Social History     Tobacco Use    Smoking status: Some Days     Types: Vaping with nicotine     Start date: 2019    Smokeless tobacco: Never   Substance Use Topics    Alcohol use: Yes     Comment: 4 drinks per week     Drug use: Never       Review of Systems:  12 system review of systems is negative except for the symptoms mentioned in HPI.      Objective:     Vitals:    10/12/22 0944   BP: 101/70   BP Location: Left arm   Patient Position: Sitting   BP Method: Large (Automatic)   Pulse: 71   Weight: 62 kg (136 lb 11 oz)   Height: 5' 10.5" (1.791 m)     General: NAD, well nourished   Eyes: no " tearing, discharge, no erythema   ENT: moist mucous membranes of the oral cavity, nares patent    Neck: Supple, full range of motion  Cardiovascular: Warm and well perfused, pulses equal and symmetrical  Lungs: Normal work of breathing, normal chest wall excursions  Skin: No rash, lesions, or breakdown on exposed skin  Psychiatry: Mood and affect are appropriate   Abdomen: soft, non tender, non distended  Extremities: No cyanosis, clubbing or edema.    Neurological   MENTAL STATUS: Alert and oriented to person, place, and time. Attention and concentration within normal limits. Speech without dysarthria, able to name and repeat without difficulty. Recent and remote memory within normal limits   CRANIAL NERVES: Visual fields intact. PERRL. EOMI. Facial sensation intact. Face symmetrical. Hearing grossly intact. Full shoulder shrug bilaterally. Tongue protrudes midline   SENSORY: Sensation is intact to light touch in all extremities including throughout left arm.    MOTOR: Normal bulk and tone. No pronator drift.  5/5 shoulder abduction, elbow flexion/extension, finger abduction bilaterally. 5/5 hip flexion, knee extension/flexion, foot dorsi/plantarflexion bilaterally.    REFLEXES: Symmetric and 2+ throughout. Toes downgoing bilaterally.   CEREBELLAR/COORDINATION/GAIT: Gait steady with normal arm swing and stride length. Finger to nose intact.     MRI Brain w/o contrast 4/15/21 - normal    EMG 5/20/21 - normal per notes, unable to view report    MRI C spine c/s 7/26/22 (read under Media) -  1. There is straightening of the cervical lordosis which may be secondary to muscle spasm.  2. Minimal annular bulges are present from C2-3 through C6-7, inclusive, without spinal stenosis or foraminal restriction.

## 2022-10-13 ENCOUNTER — PATIENT MESSAGE (OUTPATIENT)
Dept: INTERNAL MEDICINE | Facility: CLINIC | Age: 32
End: 2022-10-13
Payer: COMMERCIAL

## 2022-10-20 ENCOUNTER — PATIENT MESSAGE (OUTPATIENT)
Dept: NEUROLOGY | Facility: CLINIC | Age: 32
End: 2022-10-20
Payer: COMMERCIAL

## 2022-10-20 ENCOUNTER — TELEPHONE (OUTPATIENT)
Dept: INTERNAL MEDICINE | Facility: CLINIC | Age: 32
End: 2022-10-20
Payer: COMMERCIAL

## 2022-10-20 DIAGNOSIS — R20.2 PARESTHESIA OF LEFT ARM: Primary | ICD-10-CM

## 2022-10-24 NOTE — TELEPHONE ENCOUNTER
Discussed normal MRI Brain and C spine results with patient. Given no significant stenosis or narrowing on C spine, unclear if patient's numbness/tingling would improve with physical therapy. Discussed this with patient, who would like to try PT for about a month to see if it can improve her symptoms. Order placed.    Ivet Elizabeth MD  Neurology PGY-2

## 2022-11-14 ENCOUNTER — CLINICAL SUPPORT (OUTPATIENT)
Dept: REHABILITATION | Facility: HOSPITAL | Age: 32
End: 2022-11-14
Payer: COMMERCIAL

## 2022-11-14 DIAGNOSIS — R20.2 PARESTHESIA OF LEFT ARM: ICD-10-CM

## 2022-11-14 PROCEDURE — 97161 PT EVAL LOW COMPLEX 20 MIN: CPT | Mod: PN

## 2022-11-15 NOTE — PLAN OF CARE
[facility]  Physical Therapy Initial Evaluation    Name: Rachel Duncan  Clinic Number: [id number]    Therapy Diagnosis:   Encounter Diagnosis   Name Primary?    Paresthesia of left arm      Physician: Ivet Elizabeth MD    Physician Orders: PT Eval and Treat  Medical Diagnosis from Referral: Paresthesia of left arm [R20.2]  Evaluation Date: 11/14/2022  Authorization Period Expiration: 12/31/2022  Plan of Care Expiration: 1/14/2022  Visit # / Visits authorized: 1/ 1  FOTO: 1/10      Time In: 5:00  Time Out: 5:45  Total Billable Time: 45 minutes    Precautions: Standard, Standard    Subjective   Date of onset: Chronic  History of current condition - Rachel reports: Ms. Rachel Duncan is a 32 y.o. female with no significant PMH, right hand dominant presenting for evaluation of left arm numbness/tingling that started over a year ago. Initially intermittent, now daily and lasts most of the day. Mostly in upper arm, goes to pinky and ring fingers and less frequently to the other fingers. Also occurs sometimes in left face since a few months ago, previously has happened in both legs and right arm. Feels that staying calm has helped so that symptoms are only in left arm. No provoking factors noted, improved by drinking wine--has one glass a night. Eats a regular diet without restrictions. Takes turmeric/curcumin that helped her upper back pain, as well as vitamin D and B12 supplement.   Hit top of head a year ago and has had headaches since then. Did not seek medical attention at that time. Headache lasts 3-5 seconds, location varies, sometimes with tinnitus. Was happening daily over the summer, less frequently recently. Occurs while sitting. Don't limit activity.     Medical History:   Past Medical History:   Diagnosis Date    Mononucleosis     Ovarian cyst        Surgical History:   Rachel Duncan  has a past surgical history that includes No past surgeries.    Medications:    Rachel currently has no medications in their medication list.    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    Pain:   Current 2/10, worst 4/10, best 2/10   Location: left shoulder   Description: Numb  Aggravating Factors: Standing, Bending, and Night Time  Easing Factors: nothing    Pts goals: To relieve my numbness and improve mobility    Objective     Range of Motion/Strength:   CERVICAL AROM Pain/Dysfunction with Movement   Flexion WNL    Extension WNL    Right side bending WNL    Left side bending WNL    Right rotation WNL    Left rotation WNL      Thoracolumbar AROM Pain/Dysfunction with Movement   Flexion WNL    Extension WNL    Right side bending WNL    Left side bending WNL    Right rotation 75%    Left rotation 50%         U/E MMT Right Left Pain/Dysfunction with Movement   Shoulder Flexion 4/5 4/5    Shoulder Extension 4/5 4/5    Shoulder Abduction 4/5 4/5    Shoulder Adduction 4/5 4/5    Shoulder IR 4/5 4/5    Shoulder ER  @ 0* Abduction 4/5 4/5    Shoulder ER  @ 90* Abduction 4/5 4/5    Elbow Flexion  4+/5 4+/5    Elbow Extension 4+/5 4+/5    Flexibility: Decreased Upper Trap Flexibility (Bilateral)    Special Tests: Salvatore Test: Positive    CMS Impairment/Limitation/Restriction for FOTO Survey    Therapist reviewed FOTO scores for Rachel Duncan on 11/14/2022.   FOTO documents entered into [employer] - see Media section.    Limitation Score: 98%         TREATMENT     Home Exercises Provided and Patient Education Provided     Education provided:   - HEP    Written Home Exercises Provided: yes.  Exercises were reviewed and Rachel was able to demonstrate them prior to the end of the session.  Rachel demonstrated good  understanding of the education provided.      See EMR under Patient Instructions for exercises provided 11/14/2022.      Assessment   Rachel is a 32 y.o. female referred to outpatient Physical Therapy with a medical diagnosis of Paresthesia of left arm [R20.2]. Pt presents with decreased thoracolumbar ROM, decreased UE strength, decreased functional mobility, and increased pain. Pt was educated on compliance with HEP and role of PT. Signs and symptoms are consistent with thoracic outlet syndrome of L Shoulder.    Pt prognosis is Good.   Pt will benefit from skilled outpatient Physical Therapy to address the deficits stated above and in the chart below, provide pt/family education, and to maximize pt's level of independence.     Plan of care discussed with patient: Yes  Pt's spiritual, cultural and educational needs considered and patient is agreeable to the plan of care and goals as stated below:     Anticipated Barriers for therapy: None    Medical Necessity is demonstrated by the following  History  Co-morbidities and personal factors that may impact the plan of care Co-morbidities:   See Medical Hx    Personal Factors:   no deficits     low   Examination  Body Structures and Functions, activity limitations and participation restrictions that may impact the plan of care Body Regions:   neck  back  upper extremities    Body Systems:    gross symmetry  ROM  strength  balance  gait  transfers    Participation Restrictions:   None    Activity limitations:   Learning and applying knowledge  no deficits    General Tasks and Commands  no deficits    Communication  no deficits    Mobility  lifting and carrying objects  fine hand use (grasping/picking up)    Self care  no deficits    Domestic Life  no deficits    Interactions/Relationships  no deficits    Life Areas  no deficits    Community and Social Life  community life  recreation and leisure         moderate   Clinical Presentation stable and uncomplicated low   Decision Making/ Complexity Score: low        Goals:  Short Term Goals (4 Weeks):   1. Pt will be independent with HEP to supplement PT in improving pain free cervical mobility  2. Pt will improve UE MMTs by 1/2 grade in all planes to improve strength for lifting and carrying tasks.  3. Pt will demonstrate improved sitting posture to decrease pain experienced in head and neck.  Long Term Goals (8 Weeks):   1. Pt will improve FOTO to </=2% limitation to improve perceived limitation with changing and maintaining mobility.  2. Pt will improve thoracolumbar AROM to WNL in all planes to improve cervical mobility for driving   3. Pt will improve UE MMTs 1 grade in all planes to improve strength for lifting and carrying tasks.  4. Pt will report decreased numbness in L shoulder and arm with AROM in all planes to promote QOL.     Plan   Plan of care Certification: 11/14/2022 to 1/14/2022.    Outpatient Physical Therapy 2 times weekly for 8 weeks to include the following interventions: Aquatic Therapy, Cervical/Lumbar Traction, Gait Training, Manual Therapy, Moist Heat/ Ice, Neuromuscular Re-ed, Patient Education, Self Care, Therapeutic Activities, Therapeutic Exercise, and Ultrasound, ASTYM, Kinesiotaping PRN, Functional Dry Needling    Darrick Nelson, PT, DPT

## 2022-11-17 ENCOUNTER — CLINICAL SUPPORT (OUTPATIENT)
Dept: REHABILITATION | Facility: HOSPITAL | Age: 32
End: 2022-11-17
Payer: COMMERCIAL

## 2022-11-17 DIAGNOSIS — M54.9 UPPER BACK PAIN: ICD-10-CM

## 2022-11-17 DIAGNOSIS — R20.2 PARESTHESIA OF LEFT ARM: Primary | ICD-10-CM

## 2022-11-17 PROCEDURE — 97140 MANUAL THERAPY 1/> REGIONS: CPT | Mod: PN,CQ

## 2022-11-17 PROCEDURE — 97110 THERAPEUTIC EXERCISES: CPT | Mod: PN,CQ

## 2022-11-17 NOTE — PROGRESS NOTES
[facility]   Physical Therapy Treatment Note     Name: Rachel Duncan  Clinic Number: [id number]    Therapy Diagnosis:   Encounter Diagnoses   Name Primary?    Paresthesia of left arm Yes    Upper back pain      Physician: Ivet Elizabeth MD    Visit Date: 11/17/2022    Physician Orders: PT Eval and Treat  Medical Diagnosis from Referral: Paresthesia of left arm [R20.2]  Evaluation Date: 11/14/2022  Authorization Period Expiration: 12/31/2022  Plan of Care Expiration: 1/14/2022  Visit # / Visits authorized: 1/ 1  FOTO: 1/10  Precautions: Standard, Standard     PTA Visit #: 1/5     Time In: 830  Time Out: 915  Total Billable Time: 45 minutes    SUBJECTIVE     Pt reports: that she continues to have left hand and are N/T that is mostly constant through the day.  She reports that she does get intermittent headaches through the day that last 3-5sec duration.  Patient states that her symptoms increase with over-head motions/movements.    She was compliant with home exercise program.  Response to previous treatment: no adverse reactions  Functional change: no reported change    Pain: 1/10  Location: left neck  and shoulder      OBJECTIVE     Objective Measures updated at progress report unless specified.     Treatment     Rachel received the treatments listed below:      therapeutic exercises to develop strength, endurance, posture, and core stabilization for 30 minutes including:    Seated scap retraction 5sec 15x  Seated bruggers with red theraband 3x10  Standing Rows with red theratube 2min  Standing Shoulder extension with Red theratube 2min  Standing shoulder Depression with red theratube 2min  Supine Serratus Punches with 2lb cane 3x10  Seated cervical retraction 10x  Seated Cervical retraction with extension 10x    manual therapy techniques: Joint mobilizations, Soft tissue Mobilization, and Friction Massage were applied to the: neck shoulder for 10 minutes, including:    STM to the  cervical paraspinals, upper trap and levatar scapula   Nerve Glides             Patient Education and Home Exercises     Home Exercises Provided and Patient Education Provided     Education provided:     Complete all exercise and acitivities of daily living in pain free range      Written Home Exercises Provided: Patient instructed to cont prior HEP. Exercises were reviewed and Rachel was able to demonstrate them prior to the end of the session.  Rachel demonstrated good  understanding of the education provided. See EMR under Patient Instructions for exercises provided during therapy sessions    ASSESSMENT     Good tolerance to exercise patient able to incorporate new exercise in a pain free range.  Patient did require some verbal cueing for correct positioning and sequencing of exercise.      Rachel Is progressing well towards her goals.   Pt prognosis is Good.     Pt will continue to benefit from skilled outpatient physical therapy to address the deficits listed in the problem list box on initial evaluation, provide pt/family education and to maximize pt's level of independence in the home and community environment.     Pt's spiritual, cultural and educational needs considered and pt agreeable to plan of care and goals.     Anticipated barriers to physical therapy: none    Goals:     Short Term Goals (4 Weeks):   1. Pt will be independent with HEP to supplement PT in improving pain free cervical mobility  2. Pt will improve UE MMTs by 1/2 grade in all planes to improve strength for lifting and carrying tasks.  3. Pt will demonstrate improved sitting posture to decrease pain experienced in head and neck.  Long Term Goals (8 Weeks):   1. Pt will improve FOTO to </=2% limitation to improve perceived limitation with changing and maintaining mobility.  2. Pt will improve thoracolumbar AROM to WNL in all planes to improve cervical mobility for driving   3. Pt will improve UE MMTs 1 grade in all planes to  improve strength for lifting and carrying tasks.  4. Pt will report decreased numbness in L shoulder and arm with AROM in all planes to promote QOL.     PLAN     Progress as tolerated per Plan of Care      Tunde Lozano, PTA

## 2022-11-29 ENCOUNTER — CLINICAL SUPPORT (OUTPATIENT)
Dept: REHABILITATION | Facility: HOSPITAL | Age: 32
End: 2022-11-29
Payer: COMMERCIAL

## 2022-11-29 DIAGNOSIS — M62.81 MUSCLE WEAKNESS (GENERALIZED): ICD-10-CM

## 2022-11-29 DIAGNOSIS — R20.2 PARESTHESIA OF LEFT ARM: Primary | ICD-10-CM

## 2022-11-29 PROCEDURE — 97140 MANUAL THERAPY 1/> REGIONS: CPT | Mod: PN

## 2022-11-29 PROCEDURE — 97110 THERAPEUTIC EXERCISES: CPT | Mod: PN

## 2022-11-29 NOTE — PROGRESS NOTES
[facility]   Physical Therapy Treatment Note     Name: Rachel Duncan  Clinic Number: [id number]    Therapy Diagnosis:   Encounter Diagnoses   Name Primary?    Paresthesia of left arm Yes    Muscle weakness (generalized)        Physician: Ivet Elizabeth MD    Visit Date: 11/29/2022    Physician Orders: PT Eval and Treat  Medical Diagnosis from Referral: Paresthesia of left arm [R20.2]  Evaluation Date: 11/14/2022  Authorization Period Expiration: 12/31/2022  Plan of Care Expiration: 1/14/2022  Visit # / Visits authorized: 2/ 12 (+ eval)  FOTO: 1/10  Precautions: Standard, Standard     PTA Visit #: 0/5     Time In: 748  Time Out: 830  Total Billable Time: 42 minutes    SUBJECTIVE     Pt reports: that she feels a lot less stressed about her arm pain now that she knows what is going on. She reports that she continues to have pretty constant numbness/tingling. She does get some relief with the scalene stretching.    She was compliant with home exercise program.  Response to previous treatment: no adverse reactions  Functional change: no reported change    Pain: 1/10  Location: left neck  and shoulder      OBJECTIVE     Objective Measures updated at progress report unless specified.     Treatment     Rachel received the treatments listed below:      therapeutic exercises to develop strength, endurance, posture, and core stabilization for 30 minutes including:    Prone scap retraction 5sec 20x  Seated bruggers with red theraband 3x10  Horizontal abduction w/ red theraband 2x12  Standing Rows with red theratube 2min  Supine chin tuck neutral and w/ rotation 15x  Pec stretch 2 x 30 seconds  1/2 foam roller:   -snow angels x15   -claps x15   -shoulder flexion x15    Not completed:  Standing Shoulder extension with Red theratube 2min  Standing shoulder Depression with red theratube 2min  Supine Serratus Punches with 2lb cane 3x10      manual therapy techniques: Joint mobilizations, Soft  tissue Mobilization, and Friction Massage were applied to the: neck shoulder for 15 minutes, including:    CTJ MET rotation  C1-2 MET rotation  STM to the cervical paraspinals, upper trap and levatar scapula   Manual nerve glides in supine x20              Patient Education and Home Exercises     Home Exercises Provided and Patient Education Provided     Education provided:     Complete all exercise and acitivities of daily living in pain free range      Written Home Exercises Provided: Patient instructed to cont prior HEP. Exercises were reviewed and Rachel was able to demonstrate them prior to the end of the session.  Rachel demonstrated good  understanding of the education provided. See EMR under Patient Instructions for exercises provided during therapy sessions    ASSESSMENT     Pt tolerated session well today with no adverse reaction. Pt reported N/T down her arm with the pec stretching today which goes away immediately when she gets out of the position. Pt did not report any increased N/T while on the 1/2 foam roller to stretch her pecs.      Rachel Is progressing well towards her goals.   Pt prognosis is Good.     Pt will continue to benefit from skilled outpatient physical therapy to address the deficits listed in the problem list box on initial evaluation, provide pt/family education and to maximize pt's level of independence in the home and community environment.     Pt's spiritual, cultural and educational needs considered and pt agreeable to plan of care and goals.     Anticipated barriers to physical therapy: none    Goals:     Short Term Goals (4 Weeks):   1. Pt will be independent with HEP to supplement PT in improving pain free cervical mobility  2. Pt will improve UE MMTs by 1/2 grade in all planes to improve strength for lifting and carrying tasks.  3. Pt will demonstrate improved sitting posture to decrease pain experienced in head and neck.  Long Term Goals (8 Weeks):   1. Pt will  improve FOTO to </=2% limitation to improve perceived limitation with changing and maintaining mobility.  2. Pt will improve thoracolumbar AROM to WNL in all planes to improve cervical mobility for driving   3. Pt will improve UE MMTs 1 grade in all planes to improve strength for lifting and carrying tasks.  4. Pt will report decreased numbness in L shoulder and arm with AROM in all planes to promote QOL.     PLAN     Progress as tolerated per Plan of Care      Zuleyka Carranza, PT

## 2022-12-01 ENCOUNTER — CLINICAL SUPPORT (OUTPATIENT)
Dept: REHABILITATION | Facility: HOSPITAL | Age: 32
End: 2022-12-01
Payer: COMMERCIAL

## 2022-12-01 ENCOUNTER — TELEPHONE (OUTPATIENT)
Dept: OBSTETRICS AND GYNECOLOGY | Facility: CLINIC | Age: 32
End: 2022-12-01
Payer: COMMERCIAL

## 2022-12-01 DIAGNOSIS — M62.81 MUSCLE WEAKNESS (GENERALIZED): ICD-10-CM

## 2022-12-01 DIAGNOSIS — R20.2 PARESTHESIA OF LEFT ARM: Primary | ICD-10-CM

## 2022-12-01 PROCEDURE — 97140 MANUAL THERAPY 1/> REGIONS: CPT | Mod: PN

## 2022-12-01 PROCEDURE — 97110 THERAPEUTIC EXERCISES: CPT | Mod: PN

## 2022-12-01 NOTE — PROGRESS NOTES
[facility]   Physical Therapy Treatment Note     Name: Rachel Duncan  Clinic Number: [id number]    Therapy Diagnosis:   Encounter Diagnoses   Name Primary?    Paresthesia of left arm Yes    Muscle weakness (generalized)          Physician: Ivet Elizabeth MD    Visit Date: 12/1/2022    Physician Orders: PT Eval and Treat  Medical Diagnosis from Referral: Paresthesia of left arm [R20.2]  Evaluation Date: 11/14/2022  Authorization Period Expiration: 12/31/2022  Plan of Care Expiration: 1/14/2022  Visit # / Visits authorized: 2/ 12 (+ eval)  FOTO: 1/10  Precautions: Standard, Standard     PTA Visit #: 0/5     Time In: 1256  Time Out: 141  Total Billable Time: 42 minutes    SUBJECTIVE     Pt reports: that the numbness has gone down quite a it. She states she still gets it daily but it is more on and off and the intensity is down as well. She states that now when she stretches it helps the symptoms decrease. She states that she is done with her current job and thinks that that decreased her stress levels quite a bit.    She was compliant with home exercise program.  Response to previous treatment: no adverse reactions  Functional change: no reported change    Pain: 0/10  Location: left neck  and shoulder      OBJECTIVE     Objective Measures updated at progress report unless specified.     Treatment     Rachel received the treatments in bold listed below:      therapeutic exercises to develop strength, endurance, posture, and core stabilization for 34 minutes including:    Prone scap retraction : Is, Ts, Ws, 2x12   Standing Rows with red +green theratube 2x15  Supine chin tuck neutral and w/ rotation 15x  Pec stretch 2 x 30 seconds  1/2 foam roller:   -snow angels x15   -claps x15   -punches x15   -bruggers 2x12 green theraband    -horizontal abduction 2x12 green theraband    -alternating diagonals 2x12 green theraband   Bent over rows 5# 2x15  Median nerve glides 2x10    Not  completed:  Standing Shoulder extension with Red theratube 2min  Standing shoulder Depression with red theratube 2min  Supine Serratus Punches with 2lb cane 3x10      manual therapy techniques: Joint mobilizations, Soft tissue Mobilization, and Friction Massage were applied to the: neck shoulder for 08 minutes, including:    CTJ MET rotation  C1-2 MET rotation  1st rib mobilizations posteriorly         STM to the cervical paraspinals, upper trap and levatar scapula   Manual nerve glides in supine x20              Patient Education and Home Exercises     Home Exercises Provided and Patient Education Provided     Education provided:     Complete all exercise and acitivities of daily living in pain free range      Written Home Exercises Provided: Patient instructed to cont prior HEP. Exercises were reviewed and Rachel was able to demonstrate them prior to the end of the session.  Rachel demonstrated good  understanding of the education provided. See EMR under Patient Instructions for exercises provided during therapy sessions    ASSESSMENT   Pt tolerated session well with no adverse effect. Pt experienced an increase in pain with anterior 1st rib mobilizations on the L but was able to tolerate them posteriorly and reported a decrease in stiffness with L rotation afterwards. Session focused mostly on postural strengthening due to report of a decrease if numbness.       Rachel Is progressing well towards her goals.   Pt prognosis is Good.     Pt will continue to benefit from skilled outpatient physical therapy to address the deficits listed in the problem list box on initial evaluation, provide pt/family education and to maximize pt's level of independence in the home and community environment.     Pt's spiritual, cultural and educational needs considered and pt agreeable to plan of care and goals.     Anticipated barriers to physical therapy: none    Goals:     Short Term Goals (4 Weeks):   1. Pt will be  independent with HEP to supplement PT in improving pain free cervical mobility  2. Pt will improve UE MMTs by 1/2 grade in all planes to improve strength for lifting and carrying tasks.  3. Pt will demonstrate improved sitting posture to decrease pain experienced in head and neck.  Long Term Goals (8 Weeks):   1. Pt will improve FOTO to </=2% limitation to improve perceived limitation with changing and maintaining mobility.  2. Pt will improve thoracolumbar AROM to WNL in all planes to improve cervical mobility for driving   3. Pt will improve UE MMTs 1 grade in all planes to improve strength for lifting and carrying tasks.  4. Pt will report decreased numbness in L shoulder and arm with AROM in all planes to promote QOL.     PLAN     Progress as tolerated per Plan of Care      Zuleyka Carranza, PT

## 2023-03-23 ENCOUNTER — CLINICAL SUPPORT (OUTPATIENT)
Dept: REHABILITATION | Facility: HOSPITAL | Age: 33
End: 2023-03-23
Payer: COMMERCIAL

## 2023-03-23 DIAGNOSIS — R20.2 PARESTHESIA OF LEFT ARM: ICD-10-CM

## 2023-03-23 DIAGNOSIS — M62.81 MUSCLE WEAKNESS (GENERALIZED): ICD-10-CM

## 2023-03-23 DIAGNOSIS — M54.9 UPPER BACK PAIN: Primary | ICD-10-CM

## 2023-03-23 PROCEDURE — 97110 THERAPEUTIC EXERCISES: CPT | Mod: PN

## 2023-03-23 PROCEDURE — 97140 MANUAL THERAPY 1/> REGIONS: CPT | Mod: PN

## 2023-03-23 NOTE — PLAN OF CARE
[facility]   Physical Therapy Treatment Note     Name: Rachel Duncan  Clinic Number: [id number]    Therapy Diagnosis:   Encounter Diagnoses   Name Primary?    Upper back pain Yes    Muscle weakness (generalized)     Paresthesia of left arm        Physician: No ref. provider found    Visit Date: 3/23/2023    Physician Orders: PT Eval and Treat  Medical Diagnosis from Referral: Paresthesia of left arm [R20.2]  Evaluation Date: 3/23/2023  Authorization Period Expiration: self-pay  Plan of Care Expiration: 6/15/2023  Visit # / Visits authorized: 1/1 (self-pay)  FOTO: 1/10  Precautions: Standard    PTA Visit #: 0/5     Time In: 2:30 pm  Time Out: 3:15 pm  Total Billable Time: 45 minutes    SUBJECTIVE     Pt reports: was seeing therapists for a couple of weeks and then had to stop as she was going through work change and wedding and has to stop therapy. Patient reports it was feeling better with therapy/helping but was not completely resolved. Patient reports that since stopping it is back to where it was prior to starting therapy. Patient reports that it is starting to get a little bit painful in left chest and outside of left shoulder. Patient reports that she does not notice it with aggravating factors specifically, just gets painful and numb throughout the day. Patient reports that her shoulder pops throughout specific movements. Patient reports that her arm is numb most of the time and daily notices it. Patient reports continued numbness on left bicep region and into her pinky and ring finger. Patient reports that sometimes her arm will feel warm/get hot. Patient reports that she gets short burst of headaches around her head and also gets dizzy mostly when she is standing, sometimes when she is sitting. Patient reports that she is not sure if it specifically a stress response. Patient denies vertigo.     11/14/022: "Rachel reports: Ms. Rachel Duncan is a 32 y.o. female with no " "significant PMH, right hand dominant presenting for evaluation of left arm numbness/tingling that started over a year ago. Initially intermittent, now daily and lasts most of the day. Mostly in upper arm, goes to pinky and ring fingers and less frequently to the other fingers. Also occurs sometimes in left face since a few months ago, previously has happened in both legs and right arm. Feels that staying calm has helped so that symptoms are only in left arm. No provoking factors noted, improved by drinking wine--has one glass a night. Eats a regular diet without restrictions. Takes turmeric/curcumin that helped her upper back pain, as well as vitamin D and B12 supplement.   Hit top of head a year ago and has had headaches since then. Did not seek medical attention at that time. Headache lasts 3-5 seconds, location varies, sometimes with tinnitus. Was happening daily over the summer, less frequently recently. Occurs while sitting. Don't limit activity."    She was compliant with home exercise program.  Response to previous treatment: no adverse reactions  Functional change: no reported change    Pain: 0/10  Location: left neck  and shoulder      OBJECTIVE     Bilateral shoulder rounding (left > right), FHP, anterior tipping of scapula    Range of Motion/Strength:   CERVICAL AROM Pain/Dysfunction with Movement   Flexion WNL     Extension WNL     Right side bending WNL     Left side bending WNL     Right rotation WNL     Left rotation WNL        Thoracolumbar AROM Pain/Dysfunction with Movement   Flexion WNL     Extension WNL     Right side bending WNL     Left side bending WNL     Right rotation 75%     Left rotation 50%                   Significant increased PASSIVE RANGE OF MOTION in all cervical directions indicating decreased stability/neuromuscular control with reports of stiffness of cervical musculature      U/E MMT Right Left Pain/Dysfunction with Movement   Shoulder Flexion 4+/5 4/5     Shoulder Extension " 4+/5 4/5     Shoulder Abduction 4+/5 4/5     Shoulder Adduction 4+/5 4/5     Shoulder IR 4+/5 4/5     Shoulder ER  @ 0* Abduction 4+/5 4/5     Shoulder ER  @ 90* Abduction 4+/5 4/5     Elbow Flexion  4+/5 4+/5     Elbow Extension 4+/5 4+/5     Flexibility: Decreased Upper Trap Flexibility (Bilateral)     Special Tests: Salvatore Test: Positive  TTP: suboccipitals, cervical paraspinals; reproduction of numbness into left arm with palpation to pectorals and inferior glide of GH joint  *reports relief with cervical distraction      Treatment     Rachel received the treatments in bold listed below:      therapeutic exercises to develop strength, endurance, posture, and core stabilization for 30 minutes including:  Reassessment  Review of HOME EXERCISE PROGRAM today:  Cervical retraction 5 sec x10  Scapular retraction 1 x 10   Bilateral shoulder EXTERNAL ROTATION RTB 1 x 10   Standing rows and extensions RTB 1 x 10   Horizontal abduction RTB 1 x 10     Not Completed:  Prone scap retraction : Is, Ts, Ws, 2x12   Standing Rows with red +green theratube 2x15  Supine chin tuck neutral and w/ rotation 15x  Pec stretch 2 x 30 seconds  1/2 foam roller:   -snow angels x15   -claps x15   -punches x15   -bruggers 2x12 green theraband    -horizontal abduction 2x12 green theraband    -alternating diagonals 2x12 green theraband   Bent over rows 5# 2x15  Median nerve glides 2x10  Standing Shoulder extension with Red theratube 2min  Standing shoulder Depression with red theratube 2min  Supine Serratus Punches with 2lb cane 3x10      manual therapy techniques: Joint mobilizations, Soft tissue Mobilization, and Friction Massage were applied to the: neck shoulder for 15 minutes, including:    CTJ MET rotation  C1-2 MET rotation  1st rib mobilizations posteriorly   Pec release, suboccipital release, cervical distraction        STM to the cervical paraspinals, upper trap and levatar scapula   Manual nerve glides in supine x20               Patient Education and Home Exercises     Home Exercises Provided and Patient Education Provided     Education provided:     Complete all exercise and acitivities of daily living in pain free range      Written Home Exercises Provided: Patient instructed to cont prior HEP. Exercises were reviewed and Rachel was able to demonstrate them prior to the end of the session.  Rachel demonstrated good  understanding of the education provided. See EMR under Patient Instructions for exercises provided during therapy sessions    ASSESSMENT     Patient returned to therapy status post break from therapy with return of symptoms. Patient demonstrates signs and symptoms consistent with thoracic outlet syndrome and subsequent decreased thoracolumbar ROM, decreased UE strength, functional mobility, and pain throughout daily activities. Patient demonstrates significant decreased cervical stabilization, postural awareness, and neuromuscular control of cervical spine. Patient will benefit from continuation of therapy services with good progress and relief with prior therapy sessions.    Rachel Is progressing well towards her goals.   Pt prognosis is Good.     Pt will continue to benefit from skilled outpatient physical therapy to address the deficits listed in the problem list box on initial evaluation, provide pt/family education and to maximize pt's level of independence in the home and community environment.     Pt's spiritual, cultural and educational needs considered and pt agreeable to plan of care and goals.     Anticipated barriers to physical therapy: none    Goals:     Short Term Goals (4 Weeks):   1. Pt will be independent with HEP to supplement PT in improving pain free cervical mobility  2. Pt will improve UE MMTs by 1/2 grade in all planes to improve strength for lifting and carrying tasks.  3. Pt will demonstrate improved sitting posture to decrease pain experienced in head and neck.  Long Term Goals  (8 Weeks):   1. Pt will improve FOTO to </=2% limitation to improve perceived limitation with changing and maintaining mobility.  2. Pt will improve thoracolumbar AROM to WNL in all planes to improve cervical mobility for driving   3. Pt will improve UE MMTs 1 grade in all planes to improve strength for lifting and carrying tasks.  4. Pt will report decreased numbness in L shoulder and arm with AROM in all planes to promote QOL.     PLAN   Plan of care Certification: 3/23/2023 to 6/15/2023     Outpatient Physical Therapy 2 times weekly for 12 weeks to include the following interventions: Aquatic Therapy, Cervical/Lumbar Traction, Gait Training, Manual Therapy, Moist Heat/ Ice, Neuromuscular Re-ed, Patient Education, Self Care, Therapeutic Activities, Therapeutic Exercise, and Ultrasound, ASTYM, Kinesiotaping PRN, Functional Dry Needling    Progress as tolerated per Plan of Care      Jaylyn Fairbanks, PT

## 2023-03-23 NOTE — PROGRESS NOTES
"[facility]   Physical Therapy Treatment Note     Name: Rachel Duncan  Clinic Number: [id number]    Therapy Diagnosis:   No diagnosis found.        Physician: No ref. provider found    Visit Date: 3/23/2023    Physician Orders: PT Eval and Treat  Medical Diagnosis from Referral: Paresthesia of left arm [R20.2]  Evaluation Date: 11/14/2022  Authorization Period Expiration: 12/31/2022  Plan of Care Expiration: 1/14/2022  Visit # / Visits authorized: 2/ 12 (+ eval)  FOTO: 1/10  Precautions: Standard, Standard     PTA Visit #: 0/5     Time In: 1256  Time Out: 141  Total Billable Time: 42 minutes    SUBJECTIVE     Pt reports: was seeing therapists for a couple of weeks and then had to stop as she was going through work change and wedding and has to stop therapy. Patient reports it was feeling better with therapy/helping but was not completely resolved. Patient reports that since stopping it is back to where it was prior to starting therapy. Patient reports that it is starting to get a little bit painful in left chest and outside of left shoulder. Patient reports that she does not notice it with aggravating factors specifically, just gets painful and numb throughout the day. Patient reports that her shoulder pops throughout specific movements. Patient reports that her arm is numb most of the time and daily notices it. Patient reports continued numbness on left bicep region and into her pinky and ring finger. Patient reports that sometimes her arm will feel warm/get hot. Patient reports that she gets short burst of headaches around her head and also gets dizzy mostly when she is standing, sometimes when she is sitting. Patient reports that she is not sure if it specifically a stress response. Patient denies vertigo.     11/14/022: "Rachel reports: Ms. Rachel Duncan is a 32 y.o. female with no significant PMH, right hand dominant presenting for evaluation of left arm numbness/tingling " "that started over a year ago. Initially intermittent, now daily and lasts most of the day. Mostly in upper arm, goes to pinky and ring fingers and less frequently to the other fingers. Also occurs sometimes in left face since a few months ago, previously has happened in both legs and right arm. Feels that staying calm has helped so that symptoms are only in left arm. No provoking factors noted, improved by drinking wine--has one glass a night. Eats a regular diet without restrictions. Takes turmeric/curcumin that helped her upper back pain, as well as vitamin D and B12 supplement.   Hit top of head a year ago and has had headaches since then. Did not seek medical attention at that time. Headache lasts 3-5 seconds, location varies, sometimes with tinnitus. Was happening daily over the summer, less frequently recently. Occurs while sitting. Don't limit activity."    She was compliant with home exercise program.  Response to previous treatment: no adverse reactions  Functional change: no reported change    Pain: 0/10  Location: left neck  and shoulder      OBJECTIVE     Bilateral shoulder rounding (left > right), FHP, anterior tipping of scapular     Range of Motion/Strength:   CERVICAL AROM Pain/Dysfunction with Movement   Flexion WNL     Extension WNL     Right side bending WNL     Left side bending WNL     Right rotation WNL     Left rotation WNL        Thoracolumbar AROM Pain/Dysfunction with Movement   Flexion WNL     Extension WNL     Right side bending WNL     Left side bending WNL     Right rotation 75%     Left rotation 50%                      U/E MMT Right Left Pain/Dysfunction with Movement   Shoulder Flexion 4+/5 4/5     Shoulder Extension 4+/5 4/5     Shoulder Abduction 4+/5 4/5     Shoulder Adduction 4+/5 4/5     Shoulder IR 4+/5 4/5     Shoulder ER  @ 0* Abduction 4+/5 4/5     Shoulder ER  @ 90* Abduction 4+/5 4/5     Elbow Flexion  4+/5 4+/5     Elbow Extension 4+/5 4+/5     Flexibility: Decreased " Upper Trap Flexibility (Bilateral)     Special Tests: Salvatore Test: Positive    Treatment     Rachel received the treatments in bold listed below:      therapeutic exercises to develop strength, endurance, posture, and core stabilization for 34 minutes including:    Prone scap retraction : Is, Ts, Ws, 2x12   Standing Rows with red +green theratube 2x15  Supine chin tuck neutral and w/ rotation 15x  Pec stretch 2 x 30 seconds  1/2 foam roller:   -snow angels x15   -claps x15   -punches x15   -bruggers 2x12 green theraband    -horizontal abduction 2x12 green theraband    -alternating diagonals 2x12 green theraband   Bent over rows 5# 2x15  Median nerve glides 2x10    Not completed:  Standing Shoulder extension with Red theratube 2min  Standing shoulder Depression with red theratube 2min  Supine Serratus Punches with 2lb cane 3x10      manual therapy techniques: Joint mobilizations, Soft tissue Mobilization, and Friction Massage were applied to the: neck shoulder for 08 minutes, including:    CTJ MET rotation  C1-2 MET rotation  1st rib mobilizations posteriorly         STM to the cervical paraspinals, upper trap and levatar scapula   Manual nerve glides in supine x20              Patient Education and Home Exercises     Home Exercises Provided and Patient Education Provided     Education provided:     Complete all exercise and acitivities of daily living in pain free range      Written Home Exercises Provided: Patient instructed to cont prior HEP. Exercises were reviewed and Rachel was able to demonstrate them prior to the end of the session.  Rachel demonstrated good  understanding of the education provided. See EMR under Patient Instructions for exercises provided during therapy sessions    ASSESSMENT   Pt tolerated session well with no adverse effect. Pt experienced an increase in pain with anterior 1st rib mobilizations on the L but was able to tolerate them posteriorly and reported a decrease in stiffness  with L rotation afterwards. Session focused mostly on postural strengthening due to report of a decrease if numbness.       Rachel Is progressing well towards her goals.   Pt prognosis is Good.     Pt will continue to benefit from skilled outpatient physical therapy to address the deficits listed in the problem list box on initial evaluation, provide pt/family education and to maximize pt's level of independence in the home and community environment.     Pt's spiritual, cultural and educational needs considered and pt agreeable to plan of care and goals.     Anticipated barriers to physical therapy: none    Goals:     Short Term Goals (4 Weeks):   1. Pt will be independent with HEP to supplement PT in improving pain free cervical mobility  2. Pt will improve UE MMTs by 1/2 grade in all planes to improve strength for lifting and carrying tasks.  3. Pt will demonstrate improved sitting posture to decrease pain experienced in head and neck.  Long Term Goals (8 Weeks):   1. Pt will improve FOTO to </=2% limitation to improve perceived limitation with changing and maintaining mobility.  2. Pt will improve thoracolumbar AROM to WNL in all planes to improve cervical mobility for driving   3. Pt will improve UE MMTs 1 grade in all planes to improve strength for lifting and carrying tasks.  4. Pt will report decreased numbness in L shoulder and arm with AROM in all planes to promote QOL.     PLAN     Progress as tolerated per Plan of Care      Jaylyn Fairbanks, PT

## 2023-05-12 ENCOUNTER — OFFICE VISIT (OUTPATIENT)
Dept: INTERNAL MEDICINE | Facility: CLINIC | Age: 33
End: 2023-05-12
Payer: COMMERCIAL

## 2023-05-12 DIAGNOSIS — F41.0 PANIC ATTACK: ICD-10-CM

## 2023-05-12 DIAGNOSIS — F41.9 ANXIETY: Primary | ICD-10-CM

## 2023-05-12 PROCEDURE — 99213 PR OFFICE/OUTPT VISIT, EST, LEVL III, 20-29 MIN: ICD-10-PCS | Mod: 95,,, | Performed by: NURSE PRACTITIONER

## 2023-05-12 PROCEDURE — 99213 OFFICE O/P EST LOW 20 MIN: CPT | Mod: 95,,, | Performed by: NURSE PRACTITIONER

## 2023-05-12 RX ORDER — PROPRANOLOL HYDROCHLORIDE 20 MG/1
20 TABLET ORAL 2 TIMES DAILY PRN
Qty: 60 TABLET | Refills: 1 | Status: SHIPPED | OUTPATIENT
Start: 2023-05-12 | End: 2023-06-09

## 2023-05-12 NOTE — PROGRESS NOTES
INTERNAL MEDICINE PROGRESS NOTE    CHIEF COMPLAINT     Chief Complaint   Patient presents with    Panic Attack       HPI     Rachel Duncan is a 33 y.o. female with paresthesia of the left arm, upper back pain, muscle weakness, and ruptured ovarian cyst who presents for a follow up visit today.    The patient location is: [place], [place]   The chief complaint leading to consultation is: anxiety and panic attacks     Visit type: audiovisual    Face to Face time with patient: 15 minutes of total time spent on the encounter, which includes face to face time and non-face to face time preparing to see the patient (eg, review of tests), Obtaining and/or reviewing separately obtained history, Documenting clinical information in the electronic or other health record, Independently interpreting results (not separately reported) and communicating results to the patient/family/caregiver, or Care coordination (not separately reported).         Each patient to whom he or she provides medical services by telemedicine is:  (1) informed of the relationship between the physician and patient and the respective role of any other health care provider with respect to management of the patient; and (2) notified that he or she may decline to receive medical services by telemedicine and may withdraw from such care at any time.    Notes:    Here with c/o anxiety x 3 years. ongoing waxing/waning left arm paresthesias without weakness. No sensory deficit appreciated on exam. So far has had negative MRI C spine with contrast and normal EMG. Concern for MS vs peripheral process such as radiculopathy.MRI of cervical spine and brain demyelination study both negative   Now dx with brachial plexus injuries.     C/o anxiety and panic attacks - would like medication for treatment.   Panic attacks - 3 to date. Heart pounding, numbness, vomiting. Feels frightened and panic. with chest pain     Anxiety Patient Health Questionnaire (BARAK-7)  1. Feeling  nervous, anxious, or on edge?: More than half the days  2. Not being able to stop or control worrying?: Several days  3. Worrying too much about different things?: Several days  4. Trouble relaxing?: Several days  5. Being so restless that it is hard to sit still?: Not at all  6. Becoming easily annoyed or irritable?: Not at all  7. Feeling afraid as if something awful might happen?: Not at all  BARAK-7 Score: 5             Past Medical History:  Past Medical History:   Diagnosis Date    Mononucleosis     Ovarian cyst        Home Medications:  Prior to Admission medications    Not on File       Review of Systems:  Review of Systems   Constitutional:  Negative for activity change and unexpected weight change.   HENT:  Positive for rhinorrhea. Negative for hearing loss and trouble swallowing.    Eyes:  Positive for visual disturbance. Negative for discharge.   Respiratory:  Positive for chest tightness. Negative for wheezing.    Cardiovascular:  Positive for chest pain and palpitations.   Gastrointestinal:  Negative for blood in stool, constipation, diarrhea and vomiting.   Endocrine: Negative for polydipsia and polyuria.   Genitourinary:  Negative for difficulty urinating, dysuria, hematuria and menstrual problem.   Musculoskeletal:  Positive for neck pain. Negative for arthralgias and joint swelling.   Neurological:  Positive for weakness and headaches.   Psychiatric/Behavioral:  Negative for confusion and dysphoric mood.      Health Maintainence:   Immunizations:  Health Maintenance         Date Due Completion Date    Pneumococcal Vaccines (Age 0-64) (1 - PCV) Never done ---    COVID-19 Vaccine (4 - Booster for Pfizer series) 02/07/2022 12/13/2021    Influenza Vaccine (Season Ended) 09/01/2023 ---    Cervical Cancer Screening 04/26/2026 4/26/2021    TETANUS VACCINE 04/15/2031 4/15/2021             PHYSICAL EXAM     There were no vitals taken for this visit.    Physical Exam  Constitutional:       Appearance: Normal  appearance. She is normal weight.   Pulmonary:      Effort: No respiratory distress.   Neurological:      Mental Status: She is alert and oriented to person, place, and time.       LABS     No results found for: LABA1C, HGBA1C  CMP  Sodium   Date Value Ref Range Status   06/04/2022 137 136 - 145 mmol/L Final     Potassium   Date Value Ref Range Status   06/04/2022 4.2 3.5 - 5.1 mmol/L Final     Chloride   Date Value Ref Range Status   06/04/2022 100 95 - 110 mmol/L Final     CO2   Date Value Ref Range Status   06/04/2022 27 23 - 29 mmol/L Final     Glucose   Date Value Ref Range Status   06/04/2022 89 70 - 110 mg/dL Final     BUN   Date Value Ref Range Status   06/04/2022 8 6 - 20 mg/dL Final     Creatinine   Date Value Ref Range Status   06/04/2022 0.8 0.5 - 1.4 mg/dL Final     Calcium   Date Value Ref Range Status   06/04/2022 9.6 8.7 - 10.5 mg/dL Final     Total Protein   Date Value Ref Range Status   06/04/2022 7.6 6.0 - 8.4 g/dL Final     Albumin   Date Value Ref Range Status   06/04/2022 4.7 3.5 - 5.2 g/dL Final     Total Bilirubin   Date Value Ref Range Status   06/04/2022 0.6 0.1 - 1.0 mg/dL Final     Comment:     For infants and newborns, interpretation of results should be based  on gestational age, weight and in agreement with clinical  observations.    Premature Infant recommended reference ranges:  Up to 24 hours.............<8.0 mg/dL  Up to 48 hours............<12.0 mg/dL  3-5 days..................<15.0 mg/dL  6-29 days.................<15.0 mg/dL       Alkaline Phosphatase   Date Value Ref Range Status   06/04/2022 49 (L) 55 - 135 U/L Final     AST   Date Value Ref Range Status   06/04/2022 16 10 - 40 U/L Final     ALT   Date Value Ref Range Status   06/04/2022 9 (L) 10 - 44 U/L Final     Anion Gap   Date Value Ref Range Status   06/04/2022 10 8 - 16 mmol/L Final     eGFR if    Date Value Ref Range Status   06/04/2022 >60.0 >60 mL/min/1.73 m^2 Final     eGFR if non African American    Date Value Ref Range Status   06/04/2022 >60.0 >60 mL/min/1.73 m^2 Final     Comment:     Calculation used to obtain the estimated glomerular filtration  rate (eGFR) is the CKD-EPI equation.        Lab Results   Component Value Date    WBC 3.63 (L) 06/04/2022    HGB 13.1 06/04/2022    HCT 38.1 06/04/2022     (H) 06/04/2022     06/04/2022     Lab Results   Component Value Date    CHOL 192 06/04/2022     Lab Results   Component Value Date    HDL 91 (H) 06/04/2022     Lab Results   Component Value Date    LDLCALC 93.0 06/04/2022     Lab Results   Component Value Date    TRIG 40 06/04/2022     Lab Results   Component Value Date    CHOLHDL 47.4 06/04/2022     Lab Results   Component Value Date    TSH 0.911 06/04/2022       ASSESSMENT/PLAN     Rachel Duncan is a 33 y.o. female     Anxiety- mild with panic disorder. Will start propranolol as needed for pain attacks. Will monitor   -     propranoloL (INDERAL) 20 MG tablet; Take 1 tablet (20 mg total) by mouth 2 (two) times daily as needed (panic attacks).  Dispense: 60 tablet; Refill: 1    Panic attackWill start propranolol as needed for pain attacks. Will monitor   -     propranoloL (INDERAL) 20 MG tablet; Take 1 tablet (20 mg total) by mouth 2 (two) times daily as needed (panic attacks).  Dispense: 60 tablet; Refill: 1      Follow up with PCP    Patient education provided from [facility]. Patient was counseled on when and how to seek emergent care.       Ernestina BELLO, APRN, FNP-c   Department of Internal Medicine - [facility]  8:16 AM

## 2023-06-09 DIAGNOSIS — F41.9 ANXIETY: ICD-10-CM

## 2023-06-09 DIAGNOSIS — F41.0 PANIC ATTACK: ICD-10-CM

## 2023-06-09 RX ORDER — PROPRANOLOL HYDROCHLORIDE 20 MG/1
TABLET ORAL
Qty: 60 TABLET | Refills: 1 | Status: SHIPPED | OUTPATIENT
Start: 2023-06-09 | End: 2023-06-25

## 2023-06-23 DIAGNOSIS — F41.0 PANIC ATTACK: ICD-10-CM

## 2023-06-23 DIAGNOSIS — F41.9 ANXIETY: ICD-10-CM

## 2023-06-24 NOTE — TELEPHONE ENCOUNTER
Refill Routing Note   Medication(s) are not appropriate for processing by [facility] for the following reason(s):      Non-participating provider    [facility] action(s):  Route Care Due:  None identified          Appointments  past 12m or future 3m with PCP    Date Provider   Last Visit   5/12/2023 Ernestina Pop NP   Next Visit   Visit date not found Ernestina Pop NP   ED visits in past 90 days: 0        Note composed:10:11 AM 06/24/2023

## 2023-06-25 RX ORDER — PROPRANOLOL HYDROCHLORIDE 20 MG/1
TABLET ORAL
Qty: 180 TABLET | Refills: 1 | Status: SHIPPED | OUTPATIENT
Start: 2023-06-25

## 2024-03-26 ENCOUNTER — HOSPITAL ENCOUNTER (EMERGENCY)
Facility: HOSPITAL | Age: 34
Discharge: HOME | End: 2024-03-26
Attending: EMERGENCY MEDICINE
Payer: COMMERCIAL

## 2024-03-26 VITALS
SYSTOLIC BLOOD PRESSURE: 126 MMHG | HEART RATE: 68 BPM | TEMPERATURE: 99 F | OXYGEN SATURATION: 100 % | WEIGHT: 145 LBS | BODY MASS INDEX: 20.3 KG/M2 | DIASTOLIC BLOOD PRESSURE: 82 MMHG | HEIGHT: 71 IN | RESPIRATION RATE: 16 BRPM

## 2024-03-26 DIAGNOSIS — N83.209 RUPTURED OVARIAN CYST: Primary | ICD-10-CM

## 2024-03-26 LAB
ALBUMIN SERPL BCP-MCNC: 4.5 G/DL (ref 3.5–5.2)
ALP SERPL-CCNC: 52 U/L (ref 55–135)
ALT SERPL W/O P-5'-P-CCNC: 16 U/L (ref 10–44)
ANION GAP SERPL CALC-SCNC: 8 MMOL/L (ref 8–16)
AST SERPL-CCNC: 19 U/L (ref 10–40)
B-HCG UR QL: NEGATIVE
BACTERIA GENITAL QL WET PREP: ABNORMAL
BASOPHILS # BLD AUTO: 0.02 K/UL (ref 0–0.2)
BASOPHILS NFR BLD: 0.4 % (ref 0–1.9)
BILIRUB SERPL-MCNC: 0.7 MG/DL (ref 0.1–1)
BILIRUB UR QL STRIP: NEGATIVE
BUN SERPL-MCNC: 12 MG/DL (ref 6–20)
CALCIUM SERPL-MCNC: 9.2 MG/DL (ref 8.7–10.5)
CHLORIDE SERPL-SCNC: 106 MMOL/L (ref 95–110)
CLARITY UR REFRACT.AUTO: CLEAR
CLUE CELLS VAG QL WET PREP: ABNORMAL
CO2 SERPL-SCNC: 24 MMOL/L (ref 23–29)
COLOR UR AUTO: YELLOW
CREAT SERPL-MCNC: 0.8 MG/DL (ref 0.5–1.4)
CTP QC/QA: YES
DIFFERENTIAL METHOD BLD: ABNORMAL
EOSINOPHIL # BLD AUTO: 0 K/UL (ref 0–0.5)
EOSINOPHIL NFR BLD: 0.4 % (ref 0–8)
ERYTHROCYTE [DISTWIDTH] IN BLOOD BY AUTOMATED COUNT: 11.8 % (ref 11.5–14.5)
EST. GFR  (NO RACE VARIABLE): >60 ML/MIN/1.73 M^2
FILAMENT FUNGI VAG WET PREP-#/AREA: ABNORMAL
GLUCOSE SERPL-MCNC: 92 MG/DL (ref 70–110)
GLUCOSE UR QL STRIP: NEGATIVE
HCT VFR BLD AUTO: 39.5 % (ref 37–48.5)
HCV AB SERPL QL IA: NORMAL
HGB BLD-MCNC: 13.3 G/DL (ref 12–16)
HGB UR QL STRIP: NEGATIVE
HIV 1+2 AB+HIV1 P24 AG SERPL QL IA: NORMAL
IMM GRANULOCYTES # BLD AUTO: 0.01 K/UL (ref 0–0.04)
IMM GRANULOCYTES NFR BLD AUTO: 0.2 % (ref 0–0.5)
KETONES UR QL STRIP: NEGATIVE
LEUKOCYTE ESTERASE UR QL STRIP: NEGATIVE
LIPASE SERPL-CCNC: 21 U/L (ref 4–60)
LYMPHOCYTES # BLD AUTO: 1.4 K/UL (ref 1–4.8)
LYMPHOCYTES NFR BLD: 28.4 % (ref 18–48)
MCH RBC QN AUTO: 35.8 PG (ref 27–31)
MCHC RBC AUTO-ENTMCNC: 33.7 G/DL (ref 32–36)
MCV RBC AUTO: 107 FL (ref 82–98)
MONOCYTES # BLD AUTO: 0.5 K/UL (ref 0.3–1)
MONOCYTES NFR BLD: 10.1 % (ref 4–15)
NEUTROPHILS # BLD AUTO: 2.9 K/UL (ref 1.8–7.7)
NEUTROPHILS NFR BLD: 60.5 % (ref 38–73)
NITRITE UR QL STRIP: NEGATIVE
NRBC BLD-RTO: 0 /100 WBC
PH UR STRIP: 6 [PH] (ref 5–8)
PLATELET # BLD AUTO: 207 K/UL (ref 150–450)
PMV BLD AUTO: 9.2 FL (ref 9.2–12.9)
POTASSIUM SERPL-SCNC: 4.5 MMOL/L (ref 3.5–5.1)
PROT SERPL-MCNC: 7.4 G/DL (ref 6–8.4)
PROT UR QL STRIP: NEGATIVE
RBC # BLD AUTO: 3.71 M/UL (ref 4–5.4)
SODIUM SERPL-SCNC: 138 MMOL/L (ref 136–145)
SP GR UR STRIP: 1.02 (ref 1–1.03)
SPECIMEN SOURCE: ABNORMAL
T VAGINALIS GENITAL QL WET PREP: ABNORMAL
URN SPEC COLLECT METH UR: NORMAL
WBC # BLD AUTO: 4.75 K/UL (ref 3.9–12.7)
WBC #/AREA VAG WET PREP: ABNORMAL
YEAST GENITAL QL WET PREP: ABNORMAL

## 2024-03-26 PROCEDURE — 87389 HIV-1 AG W/HIV-1&-2 AB AG IA: CPT | Performed by: PHYSICIAN ASSISTANT

## 2024-03-26 PROCEDURE — 99285 EMERGENCY DEPT VISIT HI MDM: CPT | Mod: 25

## 2024-03-26 PROCEDURE — 25500020 PHARM REV CODE 255: Performed by: EMERGENCY MEDICINE

## 2024-03-26 PROCEDURE — 87210 SMEAR WET MOUNT SALINE/INK: CPT | Performed by: PHYSICIAN ASSISTANT

## 2024-03-26 PROCEDURE — 87491 CHLMYD TRACH DNA AMP PROBE: CPT | Performed by: PHYSICIAN ASSISTANT

## 2024-03-26 PROCEDURE — 81025 URINE PREGNANCY TEST: CPT | Performed by: PHYSICIAN ASSISTANT

## 2024-03-26 PROCEDURE — 86803 HEPATITIS C AB TEST: CPT | Performed by: PHYSICIAN ASSISTANT

## 2024-03-26 PROCEDURE — 83690 ASSAY OF LIPASE: CPT | Performed by: PHYSICIAN ASSISTANT

## 2024-03-26 PROCEDURE — 85025 COMPLETE CBC W/AUTO DIFF WBC: CPT | Performed by: PHYSICIAN ASSISTANT

## 2024-03-26 PROCEDURE — 80053 COMPREHEN METABOLIC PANEL: CPT | Performed by: PHYSICIAN ASSISTANT

## 2024-03-26 PROCEDURE — 81003 URINALYSIS AUTO W/O SCOPE: CPT | Performed by: PHYSICIAN ASSISTANT

## 2024-03-26 RX ORDER — NAPROXEN 500 MG/1
500 TABLET ORAL 2 TIMES DAILY WITH MEALS
Qty: 60 TABLET | Refills: 0 | Status: SHIPPED | OUTPATIENT
Start: 2024-03-26

## 2024-03-26 RX ADMIN — IOHEXOL 75 ML: 350 INJECTION, SOLUTION INTRAVENOUS at 01:03

## 2024-03-26 NOTE — PROVIDER PROGRESS NOTES - EMERGENCY DEPT.
Encounter Date: 3/26/2024    ED Physician Progress Notes        Physician Note:   I received sign out of this patient due to shift change. Pending TVUS at the time of sign out.     34 y.o. Female with a PMHx of PCOS presents to the ED with RLQ abdominal pain.     CT negative for acute appendicitis.  There is a crenated cystic structure within the right adnexa measuring 4 cm with free fluid concerning for ruptured cyst. TVUS obtained for further evaluation.     Transvaginal ultrasound with right ovarian cyst of 5.3 cm.  Associated free fluid concerning for ruptured ovarian cyst. Normal vascular flow noted on right side.  This is most likely the cause of her pain this morning     On my assessment, she is resting comfortably. She denies pain currently. Currently in contact with and Discussed findings with her OBGYN. Plans for follow with her GYN in the near future. I discussed risk of ovarian torsion given size of cyst. Strict ED precautions given to return immediately for new, worsening, or concerning symptoms including but not limited to lower abdominal/pelvic pain

## 2024-03-26 NOTE — ED NOTES
Patient states cramping to right ovary onset last week, states she feels like after straining this am she had a sharp pain> right and everywhere, currently no pain

## 2024-03-26 NOTE — ED PROVIDER NOTES
"Encounter Date: 3/26/2024       History     Chief Complaint   Patient presents with    Abdominal Pain     Pt reporting LRQ abdominal pain. States she "feels like she had an ovary burst." Has had similar episodes in the past. Hx ovary torsion.     11:00 AM  Patient is a 34 year female with a history of PCOS who presents to [facility] ED via POV with her mother for emergent evaluation of right lower quadrant and pelvic pain.    Patient states that she has been having cramps for 1.5 weeks.  This morning she had acute onset of pain when she was straining to go to the bathroom.  States it was very sharp.  She felt like her uterus was cramping.  She had a difficult time moving and breathing secondary to pain for about 30 minutes before it started to subside.  Currently it is dull and sore.  She took a leave.  1.5/10 on the pain scale at this time.  She has not had fever, chills, dysuria, hematuria, frequency, vaginal discharge, concerns for STDs, or previous abdominal surgeries.  She reports history of colposcopy.    To note, [place] airport[place]  She was told by 2 OBGYN that she likely had ovarian torsion that "corrected itself" but has never had an ultrasound to confirm or evaluate.        Review of patient's allergies indicates:  No Known Allergies  Past Medical History:   Diagnosis Date    Mononucleosis     Ovarian cyst      Past Surgical History:   Procedure Laterality Date    NO PAST SURGERIES       Family History   Problem Relation Age of Onset    No Known Problems Mother     Lymphoma Father     Cirrhosis Father     Brain cancer Maternal Grandfather         possible brain tumor     Breast cancer Neg Hx     Colon cancer Neg Hx     Ovarian cancer Neg Hx      Social History     Tobacco Use    Smoking status: Some Days     Types: Vaping with nicotine     Start date: 2019    Smokeless tobacco: Never   Substance Use Topics    Alcohol use: Yes     Comment: 4 drinks per week     Drug use: Never "     Review of Systems   Constitutional:  Negative for fever.   HENT:  Negative for sore throat.    Respiratory:  Negative for cough and shortness of breath.    Cardiovascular:  Negative for chest pain.   Gastrointestinal:  Negative for abdominal pain, constipation, diarrhea, nausea and vomiting.   Genitourinary:  Positive for pelvic pain. Negative for decreased urine volume, dysuria, frequency, vaginal bleeding, vaginal discharge and vaginal pain.   Musculoskeletal:  Negative for back pain.   Skin:  Negative for rash.   Neurological:  Negative for weakness.   Hematological:  Does not bruise/bleed easily.       Physical Exam     Initial Vitals [03/26/24 1007]   BP Pulse Resp Temp SpO2   139/65 81 18 97.9 °F (36.6 °C) 100 %      MAP       --         Physical Exam    Vitals reviewed.  Constitutional: She appears well-developed and well-nourished. She is not diaphoretic. She is cooperative.  Non-toxic appearance. She does not have a sickly appearance. She does not appear ill. No distress.   HENT:   Head: Normocephalic and atraumatic.   Nose: Nose normal.   Mouth/Throat: No trismus in the jaw.   Eyes: Conjunctivae and EOM are normal.   Neck:   Normal range of motion.  Pulmonary/Chest: No accessory muscle usage. No tachypnea. No respiratory distress.   Abdominal: Abdomen is soft. She exhibits no distension. There is abdominal tenderness in the right lower quadrant. There is tenderness at McBurney's point. There is no rebound and no guarding.   Genitourinary:    Pelvic exam was performed with patient supine.   There is no rash, tenderness or injury on the right labia. There is no rash, tenderness or injury on the left labia. Uterus is not tender. Cervix exhibits no motion tenderness, no discharge and no friability. Right adnexum displays tenderness. Right adnexum displays no mass. Left adnexum displays no mass and no tenderness.    No vaginal discharge.      No signs of injury in the vagina.      Genitourinary Comments:  Chaperone present for pelvic exam.  Mild tenderness in right adnexal region but states that she feels like her pain is higher up in her abdomen.     Musculoskeletal:         General: Normal range of motion.      Cervical back: Normal range of motion.     Neurological: She is alert. She has normal strength.   Skin: Skin is warm and dry. No erythema. No pallor.         ED Course   Procedures  Labs Reviewed   VAGINAL SCREEN - Abnormal; Notable for the following components:       Result Value    WBC - Vaginal Screen Rare (*)     Bacteria - Vaginal Screen Rare (*)     All other components within normal limits    Narrative:     Release to patient->Immediate   CBC W/ AUTO DIFFERENTIAL - Abnormal; Notable for the following components:    RBC 3.71 (*)      (*)     MCH 35.8 (*)     All other components within normal limits   COMPREHENSIVE METABOLIC PANEL - Abnormal; Notable for the following components:    Alkaline Phosphatase 52 (*)     All other components within normal limits   C. TRACHOMATIS/N. GONORRHOEAE BY AMP DNA   HIV 1 / 2 ANTIBODY    Narrative:     Release to patient->Immediate   HEPATITIS C ANTIBODY    Narrative:     Release to patient->Immediate   URINALYSIS, REFLEX TO URINE CULTURE    Narrative:     Specimen Source->Urine   LIPASE   POCT URINE PREGNANCY          Imaging Results              US Pelvis Comp with Transvag NON-OB (xpd) (Final result)  Result time 03/26/24 16:56:10      Final result by Calin Uribe MD (03/26/24 16:56:10)                   Impression:      Complex cystic lesion in the right ovary measuring up to 5.3 cm with associated free fluid in the pelvis.  Findings favored to represent ruptured and/or hemorrhagic ovarian cyst in the setting acute abdominal pain.    Electronically signed by resident: Fuentes Lala  Date:    03/26/2024  Time:    16:43    Electronically signed by: Calin Uribe MD  Date:    03/26/2024  Time:    16:56               Narrative:    EXAMINATION:  US PELVIS  COMP WITH TRANSVAG NON-OB (XPD)    CLINICAL HISTORY:  right pelvic pain;    TECHNIQUE:  Transabdominal sonography of the pelvis was performed, followed by transvaginal sonography to better evaluate the uterus and ovaries.    COMPARISON:  CT abdomen pelvis from same date.    FINDINGS:  Uterus:    Size: 8.0 x 4.2 x 5.5 cm    Masses: None.    Endometrium: Normal in this pre menopausal patient, measuring 15 mm.    Cervix: Unremarkable.    Right ovary:    Size: 6.7 x 3.5 x 5.1 cm    Appearance: Complex cystic lesion containing thick wall and heterogeneous internal echoes measuring 5.3 x 2.9 x 4.8 cm.  Few ovarian follicles noted.    Vascular flow: Normal arterial and venous flow.    Left ovary:    Size: 2.6 x 2.2 x 2.4 cm    Appearance: Normal.  Few ovarian follicles.    Vascular Flow: Normal arterial and venous flow.    Free Fluid: Small volume free fluid in the pelvis.                                        CT Abdomen Pelvis With IV Contrast NO Oral Contrast (Final result)  Result time 03/26/24 14:01:44      Final result by Carl Hickman MD (03/26/24 14:01:44)                   Impression:      This report was flagged in Epic as abnormal.    1. High attenuating fluid within the pelvis suggests blood products/serosanguineous fluid.  There is a crenated appearing cystic structure within the right ovary, taken together, findings suggest sequela of ruptured ovarian cyst.  Ultrasound could be performed for further evaluation as warranted.  2. Findings suggest hepatic steatosis, correlation with LFTs recommended.  3. Please see above for several additional findings.      Electronically signed by: Carl Hickman MD  Date:    03/26/2024  Time:    14:01               Narrative:    EXAMINATION:  CT ABDOMEN PELVIS WITH IV CONTRAST    CLINICAL HISTORY:  RLQ abdominal pain (Age >= 14y);    TECHNIQUE:  Low dose axial images, sagittal and coronal reformations were obtained from the lung bases to the pubic symphysis following  the IV administration of 75 mL of Omnipaque 350 .  Oral contrast was not given.    COMPARISON:  None.    FINDINGS:  Images of the lower thorax are unremarkable.    The liver is hypoattenuating, possibly reflecting steatosis, correlation with LFTs recommended.  The spleen, pancreas, gallbladder and adrenal glands are grossly unremarkable.  There is no biliary dilation or ascites.  The portal vein, splenic vein, SMV, celiac axis and SMA all are patent.  No significant abdominal lymphadenopathy.    The kidneys enhance symmetrically without hydronephrosis or nephrolithiasis.  There is a subcentimeter lesion arising from the interpolar region of the left kidney, too small for characterization.  The bilateral ureters are unable to be followed in their entirety to the urinary bladder, no definite calculi seen or secondary findings to suggest obstructive uropathy.  The urinary bladder is unremarkable.  The uterus is unremarkable.  The left adnexa is unremarkable.  There is a crenated cystic structure within the right adnexa measuring 4 cm.  There is high attenuating fluid within the pelvis, suggesting serosanguineous blood products, suspected to reflect sequela of ruptured ovarian cyst.    The large bowel is grossly unremarkable noting a few scattered colonic diverticula without inflammation.  The terminal ileum is unremarkable.  The appendix is unremarkable.  The small bowel is grossly unremarkable.  There are a few scattered shotty periaortic, pericaval, and mesenteric lymph nodes.    No acute osseous abnormality.  No significant inguinal lymphadenopathy.                                       Medications   iohexoL (OMNIPAQUE 350) injection 75 mL (75 mLs Intravenous Given 3/26/24 1345)     Medical Decision Making  Patient is a 34 year female with a history of PCOS who presents to [facility] [place] [place] ED via POV with her mother for emergent evaluation of right lower quadrant and pelvic pain.    Differential diagnosis includes but is not  "limited to ovarian cyst, torsion, PMS syndrome, strain, sprain, appendicitis, colitis, diverticulitis, gastroenteritis, UTI.    Patient had tenderness on exam and adnexal on the right, but she also has pain and tenderness at McBurney's point.  She still has a appendix.  Will proceed with CT abdomen pelvis 1st, and continue monitor.  Her pain is 1-3/10 on the pain scale.  She declines medication at this time but knows that she can notify us if she needs anything.    Amount and/or Complexity of Data Reviewed  External Data Reviewed: notes.     Details: No US on file.  Labs: ordered. Decision-making details documented in ED Course.  Radiology: ordered.    Risk  Prescription drug management.               ED Course as of 03/27/24 0800   Tue Mar 26, 2024   1051 BP: 139/65 [CL]   1051 Temp: 97.9 °F (36.6 °C) [CL]   1051 Pulse: 81 [CL]   1051 Resp: 18 [CL]   1051 SpO2: 100 % [CL]   1120 hCG Qualitative, Urine: Negative [CL]   1232 WBC: 4.75 [CL]   1232 Hemoglobin: 13.3 [CL]   1232 Platelet Count: 207 [CL]   1232 Sodium: 138 [CL]   1232 Potassium: 4.5 [CL]   1232 Chloride: 106 [CL]   1232 Glucose: 92 [CL]   1232 Creatinine: 0.8 [CL]   1232 BILIRUBIN TOTAL: 0.7 [CL]   1232 AST: 19 [CL]   1232 ALT: 16 [CL]   1232 Lipase: 21 [CL]   1232 Trichomonas: None [CL]   1232 Clue Cells, Wet Prep: None [CL]   1232 Budding Yeast: None [CL]   1232 Fungal Hyphae: None [CL]   1232 Leukocyte Esterase, UA: Negative [CL]   1232 NITRITE UA: Negative [CL]   1232 Blood, UA: Negative [CL]   1232 Bilirubin (UA): Negative [CL]   1431 CT shows "High attenuating fluid within the pelvis suggests blood products/serosanguineous fluid.  There is a crenated appearing cystic structure within the right ovary, taken together, findings suggest sequela of ruptured ovarian cyst." Patient updated with results.  She is agreeable to ultrasound.  Her pain is controlled at this time.  She declines any medication. [CL]      ED Course User Index  [CL] Terrie Baig, " LATOYA          4:00 PM  Patient will be signed out to incoming ED team pending ultrasound.  Refer to progress note.  If no signs of torsion or acute surgical abdomen, I anticipate discharge with close follow-up with OBGYN.  She can continue OTC NSAIDs for pain relief.    UPDATE:  US showed Complex cystic lesion in the right ovary measuring up to 5.3 cm with associated free fluid in the pelvis.  Findings favored to represent ruptured and/or hemorrhagic ovarian cyst in the setting acute abdominal pain. Normal flow.   She was discharged in stable condition with close follow up with OBGYN.                 Clinical Impression:  Final diagnoses:  [N83.209] Ruptured ovarian cyst (Primary)          ED Disposition Condition    Discharge Stable          ED Prescriptions       Medication Sig Dispense Start Date End Date Auth. Provider    naproxen (NAPROSYN) 500 MG tablet Take 1 tablet (500 mg total) by mouth 2 (two) times daily with meals. 60 tablet 3/26/2024 -- Ana Hollingsworth PA-C          Follow-up Information       Follow up With Specialties Details Why Contact Info    Torrie Espinoza MD Obstetrics and Gynecology Schedule an appointment as soon as possible for a visit   [address] [place] [id number]  [phone]      [facility] - Emergency Dept Emergency Medicine  If symptoms worsen [facility] [facility] [address] [phone]               Terrie Baig PA-C  03/27/24 0807

## 2024-03-26 NOTE — DISCHARGE INSTRUCTIONS
Take naproxen 500 mg every 12 hours as needed with food for anti-inflammatory relief.  You can take acetaminophen/tylenol 650 mg every 6 hours or 1000 mg every 8 hours for added relief.  Apply ice to the area for 10-20 minutes every 4 hours. You can apply heat 2 days after for the same duration and frequency.  Follow up with OBGYN this week   Strict ED precautions given to return immediately for new, worsening, or concerning symptoms

## 2024-03-27 LAB
C TRACH DNA SPEC QL NAA+PROBE: NOT DETECTED
N GONORRHOEA DNA SPEC QL NAA+PROBE: NOT DETECTED

## 2024-05-08 ENCOUNTER — TELEPHONE (OUTPATIENT)
Dept: OBSTETRICS AND GYNECOLOGY | Facility: CLINIC | Age: 34
End: 2024-05-08
Payer: COMMERCIAL

## 2024-05-08 ENCOUNTER — TELEPHONE (OUTPATIENT)
Dept: OBSTETRICS AND GYNECOLOGY | Facility: HOSPITAL | Age: 34
End: 2024-05-08
Payer: COMMERCIAL

## 2024-05-08 DIAGNOSIS — N83.209 RUPTURED OVARIAN CYST: Primary | ICD-10-CM

## 2024-05-08 NOTE — TELEPHONE ENCOUNTER
Spoke with pt in regards to US, informed pt her request for US will be sent to provider. Pt verbalized understanding.

## 2024-05-08 NOTE — TELEPHONE ENCOUNTER
----- Message from Lynn Knig sent at 5/8/2024  9:42 AM CDT -----  Regarding: ultrasound order  Name of Who is Calling:  Pt         What is the request in detail:  Pt has an appt 5/9. She had a large ovarian cyst and has an appt tomorrow. She is asking if she can get an ultrasound before the appt for Dr Armstrong to review and check on the cyst. Please call pt back and let her know if she can get an ultrasound tomorrow before appt or today even. If not she would like to r/s the appt so he can review the u/s.         Can the clinic reply by [facility]:          What Number to Call Back if not in [facility]: [phone]

## 2024-05-13 ENCOUNTER — HOSPITAL ENCOUNTER (OUTPATIENT)
Dept: RADIOLOGY | Facility: OTHER | Age: 34
Discharge: HOME | End: 2024-05-13
Attending: OBSTETRICS & GYNECOLOGY
Payer: COMMERCIAL

## 2024-05-13 DIAGNOSIS — N83.209 RUPTURED OVARIAN CYST: ICD-10-CM

## 2024-05-13 PROCEDURE — 76830 TRANSVAGINAL US NON-OB: CPT | Mod: 26,,, | Performed by: RADIOLOGY

## 2024-05-13 PROCEDURE — 76856 US EXAM PELVIC COMPLETE: CPT | Mod: 26,,, | Performed by: RADIOLOGY

## 2024-05-13 PROCEDURE — 76830 TRANSVAGINAL US NON-OB: CPT | Mod: TC

## 2024-09-16 ENCOUNTER — PATIENT MESSAGE (OUTPATIENT)
Dept: INTERNAL MEDICINE | Facility: CLINIC | Age: 34
End: 2024-09-16
Payer: COMMERCIAL

## 2024-09-16 DIAGNOSIS — F41.9 ANXIETY: ICD-10-CM

## 2024-09-16 DIAGNOSIS — F41.0 PANIC ATTACK: ICD-10-CM

## 2024-09-17 RX ORDER — PROPRANOLOL HYDROCHLORIDE 20 MG/1
20 TABLET ORAL 2 TIMES DAILY PRN
Qty: 180 TABLET | Refills: 1 | Status: SHIPPED | OUTPATIENT
Start: 2024-09-17

## 2025-03-25 DIAGNOSIS — R79.89 ELEVATED HOMOCYSTEINE: ICD-10-CM

## 2025-03-25 DIAGNOSIS — E53.8 B12 DEFICIENCY: Primary | ICD-10-CM
